# Patient Record
Sex: FEMALE | Race: WHITE | Employment: FULL TIME | URBAN - METROPOLITAN AREA
[De-identification: names, ages, dates, MRNs, and addresses within clinical notes are randomized per-mention and may not be internally consistent; named-entity substitution may affect disease eponyms.]

---

## 2018-05-23 PROBLEM — R10.2 PELVIC PAIN: Status: ACTIVE | Noted: 2018-05-23

## 2018-05-23 PROCEDURE — 88175 CYTOPATH C/V AUTO FLUID REDO: CPT | Performed by: OBSTETRICS & GYNECOLOGY

## 2018-05-23 PROCEDURE — 87624 HPV HI-RISK TYP POOLED RSLT: CPT | Performed by: OBSTETRICS & GYNECOLOGY

## 2018-08-22 PROCEDURE — 87480 CANDIDA DNA DIR PROBE: CPT | Performed by: OBSTETRICS & GYNECOLOGY

## 2018-08-22 PROCEDURE — 87510 GARDNER VAG DNA DIR PROBE: CPT | Performed by: OBSTETRICS & GYNECOLOGY

## 2018-08-22 PROCEDURE — 87660 TRICHOMONAS VAGIN DIR PROBE: CPT | Performed by: OBSTETRICS & GYNECOLOGY

## 2018-10-11 PROCEDURE — 87536 HIV-1 QUANT&REVRSE TRNSCRPJ: CPT | Performed by: OBSTETRICS & GYNECOLOGY

## 2018-10-11 PROCEDURE — 86762 RUBELLA ANTIBODY: CPT | Performed by: OBSTETRICS & GYNECOLOGY

## 2018-10-11 PROCEDURE — 86901 BLOOD TYPING SEROLOGIC RH(D): CPT | Performed by: OBSTETRICS & GYNECOLOGY

## 2018-10-11 PROCEDURE — 87389 HIV-1 AG W/HIV-1&-2 AB AG IA: CPT | Performed by: OBSTETRICS & GYNECOLOGY

## 2018-10-11 PROCEDURE — 86702 HIV-2 ANTIBODY: CPT | Performed by: OBSTETRICS & GYNECOLOGY

## 2018-10-11 PROCEDURE — 87086 URINE CULTURE/COLONY COUNT: CPT | Performed by: OBSTETRICS & GYNECOLOGY

## 2018-10-11 PROCEDURE — 86701 HIV-1ANTIBODY: CPT | Performed by: OBSTETRICS & GYNECOLOGY

## 2018-10-11 PROCEDURE — 86850 RBC ANTIBODY SCREEN: CPT | Performed by: OBSTETRICS & GYNECOLOGY

## 2018-10-11 PROCEDURE — 86780 TREPONEMA PALLIDUM: CPT | Performed by: OBSTETRICS & GYNECOLOGY

## 2018-10-11 PROCEDURE — 86900 BLOOD TYPING SEROLOGIC ABO: CPT | Performed by: OBSTETRICS & GYNECOLOGY

## 2018-11-06 PROBLEM — F17.200 SMOKER: Status: ACTIVE | Noted: 2018-11-06

## 2018-11-06 PROBLEM — Z86.14 HISTORY OF MRSA INFECTION: Status: ACTIVE | Noted: 2018-11-06

## 2018-11-06 PROBLEM — O99.210 OBESITY AFFECTING PREGNANCY, ANTEPARTUM: Status: ACTIVE | Noted: 2018-11-06

## 2018-12-03 PROCEDURE — 81508 FTL CGEN ABNOR TWO PROTEINS: CPT | Performed by: OBSTETRICS & GYNECOLOGY

## 2018-12-03 PROCEDURE — 36415 COLL VENOUS BLD VENIPUNCTURE: CPT | Performed by: OBSTETRICS & GYNECOLOGY

## 2019-01-23 PROCEDURE — 87081 CULTURE SCREEN ONLY: CPT | Performed by: OBSTETRICS & GYNECOLOGY

## 2019-03-11 PROCEDURE — 87389 HIV-1 AG W/HIV-1&-2 AB AG IA: CPT | Performed by: OBSTETRICS & GYNECOLOGY

## 2019-04-29 ENCOUNTER — HOSPITAL ENCOUNTER (OUTPATIENT)
Facility: HOSPITAL | Age: 34
Setting detail: OBSERVATION
Discharge: HOME | End: 2019-04-29
Attending: OBSTETRICS & GYNECOLOGY | Admitting: OBSTETRICS & GYNECOLOGY
Payer: COMMERCIAL

## 2019-04-29 VITALS
DIASTOLIC BLOOD PRESSURE: 77 MMHG | TEMPERATURE: 98 F | OXYGEN SATURATION: 98 % | SYSTOLIC BLOOD PRESSURE: 133 MMHG | HEART RATE: 84 BPM | RESPIRATION RATE: 18 BRPM

## 2019-04-29 PROBLEM — Z34.90 PREGNANCY: Status: ACTIVE | Noted: 2019-04-29

## 2019-04-29 PROCEDURE — 59025 FETAL NON-STRESS TEST: CPT

## 2019-04-29 PROCEDURE — 99202 OFFICE O/P NEW SF 15 MIN: CPT

## 2019-04-29 RX ORDER — MELATONIN
325
Status: ON HOLD | COMMUNITY
End: 2019-06-02

## 2019-04-29 NOTE — NST
Nonstress Test   Patient: Shazia Palomino    Gestation: 34w4d    NST:       Variability: Moderate           Accelerations: Yes           Decelerations: None            Baseline: 145 BPM           Uterine Irritability: No           Contractions: Not presen

## 2019-04-29 NOTE — PROGRESS NOTES
POC discussed c pt. Offered to transfer to ER for evaluation or pt may be discharged to home. Pt states she does not want to go to ER. Pt instructed to return to ER if symptoms increase or persist.   Pt expresses verbal understanding.

## 2019-04-29 NOTE — PROGRESS NOTES
Pt is a 29year old female admitted to TR5/TRG5-A. Patient presents with:  Shortness Of Breath  Fatigue     Pt is  34w4d intra-uterine pregnancy. History obtained, consents signed. Oriented to room, staff, and plan of care.     Pt states that tod

## 2019-05-10 PROBLEM — O99.820 GBS (GROUP B STREPTOCOCCUS CARRIER), +RV CULTURE, CURRENTLY PREGNANT: Status: ACTIVE | Noted: 2019-05-10

## 2019-05-12 ENCOUNTER — HOSPITAL ENCOUNTER (OUTPATIENT)
Facility: HOSPITAL | Age: 34
Setting detail: OBSERVATION
Discharge: HOME | End: 2019-05-12
Attending: OBSTETRICS & GYNECOLOGY | Admitting: OBSTETRICS & GYNECOLOGY
Payer: COMMERCIAL

## 2019-05-12 VITALS
HEIGHT: 64 IN | WEIGHT: 240 LBS | HEART RATE: 81 BPM | BODY MASS INDEX: 40.97 KG/M2 | RESPIRATION RATE: 16 BRPM | DIASTOLIC BLOOD PRESSURE: 73 MMHG | TEMPERATURE: 99 F | SYSTOLIC BLOOD PRESSURE: 129 MMHG

## 2019-05-12 PROCEDURE — 99213 OFFICE O/P EST LOW 20 MIN: CPT

## 2019-05-12 PROCEDURE — 59025 FETAL NON-STRESS TEST: CPT

## 2019-05-13 NOTE — PROGRESS NOTES
Written and verbal discharge instructions given. Questions answered. Pt verbalized understanding. Pt ambulated off unit with boyfriend and family present. Pt alert and stable.

## 2019-05-13 NOTE — PROGRESS NOTES
Pt ambulated for 2 hours with no cervical change  SVE ftp/50/-4  VTX  S/s labor discussed  Follow up in office 2 days if not in labor

## 2019-05-13 NOTE — PROGRESS NOTES
Pt back from ambulating. Pt states she feels more pressure from the ctxs. Pt to bed. efm and toco applied.

## 2019-05-13 NOTE — PROGRESS NOTES
admitted to triage 5 via wheelchair with family present. Pt to bathroom, changed into gown. Pt to bed. Pt states ctxs started at 1700, denies lof or bleeding, states it feels like pressure in her abdomen and back. efm and toco applied.

## 2019-05-13 NOTE — PROGRESS NOTES
poc discussed with pt including ambulation and rechecking the cervix after two hours. Pt agrees. efm and toco removed. Pt up to ambulate with boyfriend present. Pt alert and stable.

## 2019-05-23 ENCOUNTER — HOSPITAL ENCOUNTER (OUTPATIENT)
Facility: HOSPITAL | Age: 34
Setting detail: OBSERVATION
Discharge: HOME | End: 2019-05-23
Attending: OBSTETRICS & GYNECOLOGY | Admitting: OBSTETRICS & GYNECOLOGY
Payer: COMMERCIAL

## 2019-05-23 VITALS
DIASTOLIC BLOOD PRESSURE: 68 MMHG | HEART RATE: 77 BPM | SYSTOLIC BLOOD PRESSURE: 123 MMHG | WEIGHT: 245 LBS | BODY MASS INDEX: 42 KG/M2 | TEMPERATURE: 98 F

## 2019-05-23 PROCEDURE — 99213 OFFICE O/P EST LOW 20 MIN: CPT

## 2019-05-23 PROCEDURE — 59025 FETAL NON-STRESS TEST: CPT

## 2019-05-23 NOTE — NST
Nonstress Test   Patient: Eulalia Dale    Gestation: 38w0d    NST:       Variability: Moderate           Accelerations: Yes           Decelerations: None            Baseline: 135 BPM           Uterine Irritability: No           Contractions: Irregular

## 2019-05-23 NOTE — PROGRESS NOTES
Pt admitted to triage room 1 for c/o ctx's q 5-6 minutes that became stronger and more painful at 1300. Pt denies vaginal leaking and/or bleeding. Pt denies complications with the pregnancy and denies significant md hx.  Monitor tested and applied fht's 130

## 2019-05-23 NOTE — PROGRESS NOTES
Pt discharged home with labor and kick count instructions pt verbalizes understanding. Pt encouraged to increase fluid intake and rest. Pt directed to follow up at next scheduled OB visit and to call OB if symptoms worsen or with any questions.  Pt discharg

## 2019-05-29 ENCOUNTER — TELEPHONE (OUTPATIENT)
Dept: OBGYN UNIT | Facility: HOSPITAL | Age: 34
End: 2019-05-29

## 2019-05-30 ENCOUNTER — HOSPITAL ENCOUNTER (INPATIENT)
Facility: HOSPITAL | Age: 34
LOS: 3 days | Discharge: HOME | End: 2019-06-02
Attending: OBSTETRICS & GYNECOLOGY | Admitting: OBSTETRICS & GYNECOLOGY
Payer: COMMERCIAL

## 2019-05-30 ENCOUNTER — APPOINTMENT (OUTPATIENT)
Dept: OBGYN CLINIC | Facility: HOSPITAL | Age: 34
End: 2019-05-30
Payer: COMMERCIAL

## 2019-05-30 PROCEDURE — 86780 TREPONEMA PALLIDUM: CPT | Performed by: OBSTETRICS & GYNECOLOGY

## 2019-05-30 PROCEDURE — 86901 BLOOD TYPING SEROLOGIC RH(D): CPT | Performed by: OBSTETRICS & GYNECOLOGY

## 2019-05-30 PROCEDURE — 85027 COMPLETE CBC AUTOMATED: CPT | Performed by: OBSTETRICS & GYNECOLOGY

## 2019-05-30 PROCEDURE — 86900 BLOOD TYPING SEROLOGIC ABO: CPT | Performed by: OBSTETRICS & GYNECOLOGY

## 2019-05-30 PROCEDURE — 86850 RBC ANTIBODY SCREEN: CPT | Performed by: OBSTETRICS & GYNECOLOGY

## 2019-05-30 RX ORDER — TRISODIUM CITRATE DIHYDRATE AND CITRIC ACID MONOHYDRATE 500; 334 MG/5ML; MG/5ML
30 SOLUTION ORAL 4 TIMES DAILY PRN
Status: DISCONTINUED | OUTPATIENT
Start: 2019-05-30 | End: 2019-06-01 | Stop reason: HOSPADM

## 2019-05-30 RX ORDER — ZOLPIDEM TARTRATE 5 MG/1
5 TABLET ORAL NIGHTLY PRN
Status: DISCONTINUED | OUTPATIENT
Start: 2019-05-30 | End: 2019-06-01 | Stop reason: HOSPADM

## 2019-05-30 RX ORDER — DEXTROSE, SODIUM CHLORIDE, SODIUM LACTATE, POTASSIUM CHLORIDE, AND CALCIUM CHLORIDE 5; .6; .31; .03; .02 G/100ML; G/100ML; G/100ML; G/100ML; G/100ML
INJECTION, SOLUTION INTRAVENOUS AS NEEDED
Status: DISCONTINUED | OUTPATIENT
Start: 2019-05-30 | End: 2019-06-01 | Stop reason: HOSPADM

## 2019-05-30 RX ORDER — SODIUM CHLORIDE, SODIUM LACTATE, POTASSIUM CHLORIDE, CALCIUM CHLORIDE 600; 310; 30; 20 MG/100ML; MG/100ML; MG/100ML; MG/100ML
INJECTION, SOLUTION INTRAVENOUS CONTINUOUS
Status: DISCONTINUED | OUTPATIENT
Start: 2019-05-30 | End: 2019-06-01 | Stop reason: HOSPADM

## 2019-05-30 RX ORDER — TERBUTALINE SULFATE 1 MG/ML
0.25 INJECTION, SOLUTION SUBCUTANEOUS AS NEEDED
Status: DISCONTINUED | OUTPATIENT
Start: 2019-05-30 | End: 2019-06-01 | Stop reason: HOSPADM

## 2019-05-30 RX ORDER — IBUPROFEN 600 MG/1
600 TABLET ORAL ONCE AS NEEDED
Status: DISCONTINUED | OUTPATIENT
Start: 2019-05-30 | End: 2019-06-01 | Stop reason: HOSPADM

## 2019-05-31 PROCEDURE — 3E033VJ INTRODUCTION OF OTHER HORMONE INTO PERIPHERAL VEIN, PERCUTANEOUS APPROACH: ICD-10-PCS | Performed by: OBSTETRICS & GYNECOLOGY

## 2019-05-31 PROCEDURE — 80053 COMPREHEN METABOLIC PANEL: CPT | Performed by: OBSTETRICS & GYNECOLOGY

## 2019-05-31 PROCEDURE — 82570 ASSAY OF URINE CREATININE: CPT | Performed by: OBSTETRICS & GYNECOLOGY

## 2019-05-31 PROCEDURE — 3E0P7VZ INTRODUCTION OF HORMONE INTO FEMALE REPRODUCTIVE, VIA NATURAL OR ARTIFICIAL OPENING: ICD-10-PCS | Performed by: OBSTETRICS & GYNECOLOGY

## 2019-05-31 PROCEDURE — 84550 ASSAY OF BLOOD/URIC ACID: CPT | Performed by: OBSTETRICS & GYNECOLOGY

## 2019-05-31 PROCEDURE — 85025 COMPLETE CBC W/AUTO DIFF WBC: CPT | Performed by: OBSTETRICS & GYNECOLOGY

## 2019-05-31 PROCEDURE — 0KQM0ZZ REPAIR PERINEUM MUSCLE, OPEN APPROACH: ICD-10-PCS | Performed by: OBSTETRICS & GYNECOLOGY

## 2019-05-31 PROCEDURE — 84156 ASSAY OF PROTEIN URINE: CPT | Performed by: OBSTETRICS & GYNECOLOGY

## 2019-05-31 RX ORDER — ONDANSETRON 2 MG/ML
4 INJECTION INTRAMUSCULAR; INTRAVENOUS EVERY 4 HOURS PRN
Status: DISCONTINUED | OUTPATIENT
Start: 2019-05-31 | End: 2019-06-01

## 2019-05-31 RX ORDER — NALBUPHINE HCL 10 MG/ML
2.5 AMPUL (ML) INJECTION
Status: DISCONTINUED | OUTPATIENT
Start: 2019-05-31 | End: 2019-06-01

## 2019-05-31 RX ORDER — EPHEDRINE SULFATE/0.9% NACL/PF 25 MG/5 ML
5 SYRINGE (ML) INTRAVENOUS AS NEEDED
Status: DISCONTINUED | OUTPATIENT
Start: 2019-05-31 | End: 2019-06-01

## 2019-05-31 RX ORDER — ACETAMINOPHEN 325 MG/1
650 TABLET ORAL EVERY 6 HOURS PRN
Status: DISCONTINUED | OUTPATIENT
Start: 2019-05-31 | End: 2019-06-02

## 2019-05-31 NOTE — CM/SW NOTE
SW received an order for advance directives. SW spoke with RN, and requested spiritual care completes medical POA. RN to order .      Josef Gan

## 2019-05-31 NOTE — H&P
OB H&P    33yo [id number] at 39w1d admitted for elective IOL. Denies contractions, no lof, no vb, +FM. Prenatal course complicated by obesity and GBS positive.     OB History    Para Term  AB Living   4 2 2   1 2   SAB TAB Ectopic Multiple Live Wt 245 lb (111.1 kg)   LMP 08/30/2018 (Approximate)   Breastfeeding?  Yes   BMI 42.05 kg/m²   Gen: appears well, nad  Abd: gravid, no fundal tenderness  Ext: nontender, no edema    EFM: 135/mod/+acc/-dec  [place]: q4 min  SVE: 1/50/-3    GBS pos    A/P: 35yo

## 2019-05-31 NOTE — PLAN OF CARE
Problem: BIRTH - VAGINAL/ SECTION  Goal: Fetal and maternal status remain reassuring during the birth process  Description  INTERVENTIONS:  - Monitor vital signs  - Monitor fetal heart rate  - Monitor uterine activity  - Monitor labor progression Patient/Family Short Term Goal  Description  Patient's Short Term Goal: Adequate Pain Relief    Interventions:  - See additional Care Plan goals for specific interventions   Outcome: Progressing

## 2019-05-31 NOTE — PROGRESS NOTES
[facility]      Labor Progress Note    Colon Aver Patient Status:  Inpatient    2/3/1985 MRN [id number]   Location [address] [facility] Attending Kirstin Dixon MD   Hosp Day # 1 PCP INES NICOLE      SUBJECTIVE:    Int

## 2019-05-31 NOTE — PROGRESS NOTES
[facility]      Labor Progress Note    Lady Feng Patient Status:  Inpatient    2/3/1985 MRN [id number]   Location 1818 [facility] Attending Xiomara Joaquin MD   Hosp Day # 1 PCP INES NICOLE      SUBJECTIVE:    Int

## 2019-05-31 NOTE — PROGRESS NOTES
Report to SAULO Dorado RN at this time. POC discussed and enforced. Pt stable in bed with IV infusing and call light in reach. Pt verbalized understanding of POC.

## 2019-05-31 NOTE — PROGRESS NOTES
05/31/19 1701   Clinical Encounter Type   Visited With Patient and family together   Continue Visiting No   Referral From Patient  (Pt. requested an advance directive)   Referral To    Patient Spiritual Encounters   Spiritual Interventions Chapl

## 2019-05-31 NOTE — PROGRESS NOTES
Report from GIRISH Ching RN @ this time. POC discussed and will enforce. Pt stable in bed with IV infusing and call light in reach. Pt verbalized understanding of POC. Will continue to monitor.

## 2019-06-01 PROCEDURE — 85025 COMPLETE CBC W/AUTO DIFF WBC: CPT | Performed by: OBSTETRICS & GYNECOLOGY

## 2019-06-01 RX ORDER — HYDROCODONE BITARTRATE AND ACETAMINOPHEN 5; 325 MG/1; MG/1
1 TABLET ORAL EVERY 6 HOURS PRN
Status: DISCONTINUED | OUTPATIENT
Start: 2019-06-01 | End: 2019-06-02

## 2019-06-01 RX ORDER — IBUPROFEN 600 MG/1
600 TABLET ORAL EVERY 6 HOURS
Status: DISCONTINUED | OUTPATIENT
Start: 2019-06-01 | End: 2019-06-02

## 2019-06-01 RX ORDER — SIMETHICONE 80 MG
80 TABLET,CHEWABLE ORAL 3 TIMES DAILY PRN
Status: DISCONTINUED | OUTPATIENT
Start: 2019-06-01 | End: 2019-06-02

## 2019-06-01 RX ORDER — BISACODYL 10 MG
10 SUPPOSITORY, RECTAL RECTAL ONCE AS NEEDED
Status: DISCONTINUED | OUTPATIENT
Start: 2019-06-01 | End: 2019-06-02

## 2019-06-01 RX ORDER — DOCUSATE SODIUM 100 MG/1
100 CAPSULE, LIQUID FILLED ORAL
Status: DISCONTINUED | OUTPATIENT
Start: 2019-06-01 | End: 2019-06-02

## 2019-06-01 NOTE — PROGRESS NOTES
[facility]      Labor Progress Note    Jazzmine Innocent Patient Status:  Inpatient    2/3/1985 MRN [id number]   Location 1818 [facility] Attending Rebekah Porras MD   Hosp Day # 1 PCP INES NICOLE      SUBJECTIVE:    Int

## 2019-06-01 NOTE — PROGRESS NOTES
Patient admitted to room 2216. Report received from L&D RN Wendy Abdi. ID bands verified. Pt resting in bed with call light in reach, bed in low position. Education materials reviewed (and left at bedside) with Pt and boyfriend, Louisa Guerrero.

## 2019-06-01 NOTE — PLAN OF CARE
Problem: BIRTH - VAGINAL/ SECTION  Goal: Fetal and maternal status remain reassuring during the birth process  Description  INTERVENTIONS:  - Monitor vital signs  - Monitor fetal heart rate  - Monitor uterine activity  - Monitor labor progression physical needs  - Identify cognitive and physical deficits and behaviors that affect risk of falls.   - [place] fall precautions as indicated by assessment.  - Educate pt/family on patient safety including physical limitations  - Instruct pt to call for a

## 2019-06-01 NOTE — PLAN OF CARE
Problem: SAFETY ADULT - FALL  Goal: Free from fall injury  Description  INTERVENTIONS:  - Assess pt frequently for physical needs  - Identify cognitive and physical deficits and behaviors that affect risk of falls.   - Capac fall precautions as indica appropriate  6/1/2019 0001 by Manjinder Warren RN  Outcome: Completed  5/31/2019 1938 by Manjinder Warren, RN  Outcome: Progressing     Problem: ANXIETY  Goal: Will report anxiety at manageable levels  Description  INTERVENTIONS:  - Macario Reynolds

## 2019-06-01 NOTE — PROGRESS NOTES
Report from Elaina Boo  RN @ this time. POC discussed and will enforce. Pt stable in bed with IV infusing and call light in reach. Pt verbalized understanding of POC. Will continue to monitor.

## 2019-06-01 NOTE — L&D DELIVERY NOTE
Bouchra Stinson, Girl Clarissa Silva [[id number]]    Labor Events     labor?:  No   steroids?:  None  Cervical ripening date/time:  2019 183  Cervical ripening type:  Cervidil  Antibiotics received during labor?:  Yes  Antibiotics (enter # doses in co Apgars    Living status:  Living   Apgar Scoring Key:     0 1 2    Skin color Blue or pale Acrocyanotic Completely pink    Heart rate Absent <100 bpm >100 bpm    Reflex irritability No response Grimace Cry or active withdrawal    Muscle tone Limp Some fl

## 2019-06-01 NOTE — PLAN OF CARE
Problem: SAFETY ADULT - FALL  Goal: Free from fall injury  Description  INTERVENTIONS:  - Assess pt frequently for physical needs  - Identify cognitive and physical deficits and behaviors that affect risk of falls.   - [place] fall precautions as indica previous experience with breast feeding.  - Provide information as needed about early infant feeding cues (e.g., rooting, lip smacking, sucking fingers/hand) versus late cue of crying.  - Discuss/demonstrate breast feeding aids (e.g., infant sling, nursing engorgement. - Instruct on breast care. - Provide comfort measures.   Outcome: Completed     Problem: POSTPARTUM  Goal: Establishment of adequate milk supply with medication/procedure interruptions  Description  INTERVENTIONS:  - Review techniques for mil

## 2019-06-01 NOTE — PROGRESS NOTES
Patient up to bathroom with assist x 2. Unable to void at this time. Educated on increasing PO hydration and pt verbalized understanding. Patient transferred to mother/baby room 2216 per wheelchair in stable condition with baby and personal belongings.   A

## 2019-06-01 NOTE — PROGRESS NOTES
Postpartum Day 1    Pt without complaints. Pt reports sig cramping, moderate relief with tylenol and motrin. States she had norco with her previous pregnancies.     Temp:  [97.7 °F (36.5 °C)-98.9 °F (37.2 °C)] 97.9 °F (36.6 °C)  Pulse:  [67-88] 68  Resp:  [ 05/31/19 1100 122/55 — — 76 20 —   05/31/19 1050 119/65 — — 77 — —   05/31/19 1047 135/63 — — 82 — —   05/31/19 1044 134/62 — — 79 — —   05/31/19 1040 136/63 — — 82 — —   05/31/19 1037 143/63 — — 78 — —   05/31/19 1035 — — — 68 — —   05/31/19 1034 — — —

## 2019-06-02 VITALS
HEIGHT: 64 IN | SYSTOLIC BLOOD PRESSURE: 114 MMHG | OXYGEN SATURATION: 98 % | RESPIRATION RATE: 18 BRPM | HEART RATE: 68 BPM | DIASTOLIC BLOOD PRESSURE: 62 MMHG | WEIGHT: 245 LBS | BODY MASS INDEX: 41.83 KG/M2 | TEMPERATURE: 98 F

## 2019-06-02 NOTE — PROGRESS NOTES
Postpartum Day 2    Pt without complaints.     Temp:  [97.7 °F (36.5 °C)-98.1 °F (36.7 °C)] 97.7 °F (36.5 °C)  Pulse:  [68] 68  Resp:  [18] 18  BP: (109-114)/(62) 114/62  abd  soft, NT, ND, fundus firm below umbilicus  perineum NL lochia  extr  trace edema,

## 2019-06-04 ENCOUNTER — APPOINTMENT (OUTPATIENT)
Dept: ULTRASOUND IMAGING | Facility: HOSPITAL | Age: 34
End: 2019-06-04
Attending: EMERGENCY MEDICINE
Payer: COMMERCIAL

## 2019-06-04 ENCOUNTER — APPOINTMENT (OUTPATIENT)
Dept: GENERAL RADIOLOGY | Facility: HOSPITAL | Age: 34
End: 2019-06-04
Attending: EMERGENCY MEDICINE
Payer: COMMERCIAL

## 2019-06-04 ENCOUNTER — HOSPITAL ENCOUNTER (EMERGENCY)
Facility: HOSPITAL | Age: 34
Discharge: HOME | End: 2019-06-04
Attending: EMERGENCY MEDICINE
Payer: COMMERCIAL

## 2019-06-04 ENCOUNTER — HOSPITAL ENCOUNTER (OUTPATIENT)
Facility: HOSPITAL | Age: 34
Setting detail: OBSERVATION
Discharge: HOME | End: 2019-06-04
Attending: OBSTETRICS & GYNECOLOGY | Admitting: OBSTETRICS & GYNECOLOGY
Payer: COMMERCIAL

## 2019-06-04 ENCOUNTER — APPOINTMENT (OUTPATIENT)
Dept: CT IMAGING | Facility: HOSPITAL | Age: 34
End: 2019-06-04
Attending: EMERGENCY MEDICINE
Payer: COMMERCIAL

## 2019-06-04 VITALS
OXYGEN SATURATION: 98 % | HEART RATE: 58 BPM | WEIGHT: 240 LBS | HEIGHT: 64 IN | BODY MASS INDEX: 40.97 KG/M2 | TEMPERATURE: 98 F | RESPIRATION RATE: 21 BRPM | SYSTOLIC BLOOD PRESSURE: 148 MMHG | DIASTOLIC BLOOD PRESSURE: 78 MMHG

## 2019-06-04 DIAGNOSIS — R06.00 DYSPNEA, UNSPECIFIED TYPE: Primary | ICD-10-CM

## 2019-06-04 PROCEDURE — 83735 ASSAY OF MAGNESIUM: CPT | Performed by: EMERGENCY MEDICINE

## 2019-06-04 PROCEDURE — 85025 COMPLETE CBC W/AUTO DIFF WBC: CPT | Performed by: OBSTETRICS & GYNECOLOGY

## 2019-06-04 PROCEDURE — 93005 ELECTROCARDIOGRAM TRACING: CPT

## 2019-06-04 PROCEDURE — 36415 COLL VENOUS BLD VENIPUNCTURE: CPT

## 2019-06-04 PROCEDURE — 85027 COMPLETE CBC AUTOMATED: CPT | Performed by: OBSTETRICS & GYNECOLOGY

## 2019-06-04 PROCEDURE — 99285 EMERGENCY DEPT VISIT HI MDM: CPT

## 2019-06-04 PROCEDURE — 84484 ASSAY OF TROPONIN QUANT: CPT | Performed by: OBSTETRICS & GYNECOLOGY

## 2019-06-04 PROCEDURE — 85378 FIBRIN DEGRADE SEMIQUANT: CPT | Performed by: EMERGENCY MEDICINE

## 2019-06-04 PROCEDURE — 83880 ASSAY OF NATRIURETIC PEPTIDE: CPT | Performed by: EMERGENCY MEDICINE

## 2019-06-04 PROCEDURE — 93010 ELECTROCARDIOGRAM REPORT: CPT

## 2019-06-04 PROCEDURE — 85610 PROTHROMBIN TIME: CPT | Performed by: EMERGENCY MEDICINE

## 2019-06-04 PROCEDURE — 93970 EXTREMITY STUDY: CPT | Performed by: EMERGENCY MEDICINE

## 2019-06-04 PROCEDURE — 80053 COMPREHEN METABOLIC PANEL: CPT | Performed by: OBSTETRICS & GYNECOLOGY

## 2019-06-04 PROCEDURE — 81002 URINALYSIS NONAUTO W/O SCOPE: CPT

## 2019-06-04 PROCEDURE — 85007 BL SMEAR W/DIFF WBC COUNT: CPT | Performed by: OBSTETRICS & GYNECOLOGY

## 2019-06-04 PROCEDURE — 71275 CT ANGIOGRAPHY CHEST: CPT | Performed by: EMERGENCY MEDICINE

## 2019-06-04 PROCEDURE — 84484 ASSAY OF TROPONIN QUANT: CPT | Performed by: EMERGENCY MEDICINE

## 2019-06-04 PROCEDURE — 71046 X-RAY EXAM CHEST 2 VIEWS: CPT | Performed by: EMERGENCY MEDICINE

## 2019-06-04 RX ORDER — ACETAMINOPHEN 500 MG
500 TABLET ORAL EVERY 6 HOURS PRN
COMMUNITY
End: 2019-06-25

## 2019-06-04 RX ORDER — IBUPROFEN 200 MG
200 TABLET ORAL EVERY 6 HOURS PRN
COMMUNITY
End: 2019-12-02

## 2019-06-04 RX ORDER — ACETAMINOPHEN 500 MG
1000 TABLET ORAL ONCE
Status: COMPLETED | OUTPATIENT
Start: 2019-06-04 | End: 2019-06-04

## 2019-06-04 NOTE — ED INITIAL ASSESSMENT (HPI)
Patient to ED from [facility], here for chest pain, SOB, headache, dizziness, and left arm numbness. + bilateral lower leg swelling.

## 2019-06-04 NOTE — ED PROVIDER NOTES
Chief Complaint   Patient presents with    Hypertension     Cardiac clearance 4-10-17 (lymph node Becker's_ 4-19-17 (tonsillectomy-[facility]). Denies chest pain/shortness of breath/dizziness. Complaiints of slight swelling in ankles. Dr. Lenore Luna III ENT  Performing both surgeries. Currently smoking 0.5 pack/day. Patient is a nurse at [employer]. Patient Seen in: [facility] Emergency Department    History   Patient presents with:  Chest Pain Angina (cardiovascular)  Dyspnea VA SOB (respiratory)    Stated Complaint: chest pain, SOB    HPI    This is a 43-year-old female complaint of chest ti Oral   SpO2 06/04/19 1500 99 %   O2 Device 06/04/19 1601 None (Room air)       Current:/78   Pulse 59   Temp 98 °F (36.7 °C) (Temporal)   Resp 17   Ht 162.6 cm (5' 4\")   Wt 108.9 kg   LMP 08/30/2018 (Approximate)   SpO2 98%   Breastfeeding?  No   BMI pregnant females exclusively to validate the 95% negative predictive value  for venous thromboembolism when the D-Dimer is greater than 0.50 ug/mL (FEU). Proceed with caution from clinical presentation.      POCT URINALYSIS DIPSTICK - Abnormal; Notable pressure ranging mainly in the 140s 2 of these were above 150.   Cardiac work-up was negative no evidence of coronary syndrome no evidence of pulmonary embolus the case was discussed with Yair Hanley who did not feel that these blood pressure elevation

## 2019-06-04 NOTE — PROGRESS NOTES
Dr. MAC notified re. Pt's admission, history, symptoms and vital signs. POC reviewed. Orders received.

## 2019-06-04 NOTE — PROGRESS NOTES
Pt is 35yo postpartum w/ on   admitted to 104 from MD office for evaluation of BP and pain. Pt states that she smoked @ 1330 and experienced a headache and chest pain 8/10. She also has some SOB  With the chest pain.   Pt states that she also had

## 2019-06-04 NOTE — PROGRESS NOTES
Pt c/o increased chest pain 5/10 and increased headache 4/10. Pt denies SOB, nausea or left arm numbness. Dr. Dustin Greer updated re. pts vs and pain. Orders received to transfer pt to ER for cardia workup.

## 2019-06-05 ENCOUNTER — TELEPHONE (OUTPATIENT)
Dept: OBGYN UNIT | Facility: HOSPITAL | Age: 34
End: 2019-06-05

## 2019-06-05 NOTE — ED NOTES
No change in patient's assessment. Continues to have a slight headache and mildly not feeling right to the left side of her chest, no real pain  Aware we are awaiting cardiology to call back.

## 2019-06-07 ENCOUNTER — HOSPITAL ENCOUNTER (INPATIENT)
Facility: HOSPITAL | Age: 34
LOS: 1 days | Discharge: HOME | DRG: 776 | End: 2019-06-08
Attending: OBSTETRICS & GYNECOLOGY | Admitting: OBSTETRICS & GYNECOLOGY
Payer: COMMERCIAL

## 2019-06-07 ENCOUNTER — HOSPITAL ENCOUNTER (OUTPATIENT)
Dept: CV DIAGNOSTICS | Facility: HOSPITAL | Age: 34
Discharge: HOME | End: 2019-06-07
Attending: INTERNAL MEDICINE
Payer: COMMERCIAL

## 2019-06-07 ENCOUNTER — APPOINTMENT (OUTPATIENT)
Dept: GENERAL RADIOLOGY | Facility: HOSPITAL | Age: 34
DRG: 776 | End: 2019-06-07
Attending: INTERNAL MEDICINE
Payer: COMMERCIAL

## 2019-06-07 ENCOUNTER — HOSPITAL ENCOUNTER (OUTPATIENT)
Facility: HOSPITAL | Age: 34
Setting detail: OBSERVATION
Discharge: HOME | End: 2019-06-07
Attending: OBSTETRICS & GYNECOLOGY | Admitting: OBSTETRICS & GYNECOLOGY
Payer: COMMERCIAL

## 2019-06-07 DIAGNOSIS — R42 DIZZINESS: ICD-10-CM

## 2019-06-07 DIAGNOSIS — R06.02 SOB (SHORTNESS OF BREATH): ICD-10-CM

## 2019-06-07 DIAGNOSIS — O13.9 PREGNANCY INDUCED HYPERTENSION, ANTEPARTUM: Primary | ICD-10-CM

## 2019-06-07 DIAGNOSIS — R60.0 BILATERAL LOWER EXTREMITY EDEMA: ICD-10-CM

## 2019-06-07 PROBLEM — I10 HYPERTENSION: Status: ACTIVE | Noted: 2019-06-07

## 2019-06-07 PROCEDURE — 71045 X-RAY EXAM CHEST 1 VIEW: CPT | Performed by: INTERNAL MEDICINE

## 2019-06-07 PROCEDURE — 85025 COMPLETE CBC W/AUTO DIFF WBC: CPT | Performed by: OBSTETRICS & GYNECOLOGY

## 2019-06-07 PROCEDURE — 84450 TRANSFERASE (AST) (SGOT): CPT | Performed by: OBSTETRICS & GYNECOLOGY

## 2019-06-07 PROCEDURE — 99214 OFFICE O/P EST MOD 30 MIN: CPT

## 2019-06-07 PROCEDURE — 93005 ELECTROCARDIOGRAM TRACING: CPT

## 2019-06-07 PROCEDURE — 84460 ALANINE AMINO (ALT) (SGPT): CPT | Performed by: OBSTETRICS & GYNECOLOGY

## 2019-06-07 PROCEDURE — 84550 ASSAY OF BLOOD/URIC ACID: CPT | Performed by: OBSTETRICS & GYNECOLOGY

## 2019-06-07 PROCEDURE — 82570 ASSAY OF URINE CREATININE: CPT | Performed by: OBSTETRICS & GYNECOLOGY

## 2019-06-07 PROCEDURE — 93306 TTE W/DOPPLER COMPLETE: CPT | Performed by: INTERNAL MEDICINE

## 2019-06-07 PROCEDURE — 84484 ASSAY OF TROPONIN QUANT: CPT | Performed by: INTERNAL MEDICINE

## 2019-06-07 PROCEDURE — 93010 ELECTROCARDIOGRAM REPORT: CPT | Performed by: INTERNAL MEDICINE

## 2019-06-07 PROCEDURE — 84156 ASSAY OF PROTEIN URINE: CPT | Performed by: OBSTETRICS & GYNECOLOGY

## 2019-06-07 PROCEDURE — 80053 COMPREHEN METABOLIC PANEL: CPT | Performed by: OBSTETRICS & GYNECOLOGY

## 2019-06-07 RX ORDER — ACETAMINOPHEN 500 MG
1000 TABLET ORAL EVERY 6 HOURS PRN
Status: DISCONTINUED | OUTPATIENT
Start: 2019-06-07 | End: 2019-06-08

## 2019-06-07 RX ORDER — IBUPROFEN 600 MG/1
600 TABLET ORAL EVERY 6 HOURS PRN
Status: DISCONTINUED | OUTPATIENT
Start: 2019-06-07 | End: 2019-06-08

## 2019-06-07 RX ORDER — SODIUM CHLORIDE, SODIUM LACTATE, POTASSIUM CHLORIDE, CALCIUM CHLORIDE 600; 310; 30; 20 MG/100ML; MG/100ML; MG/100ML; MG/100ML
INJECTION, SOLUTION INTRAVENOUS CONTINUOUS
Status: DISCONTINUED | OUTPATIENT
Start: 2019-06-07 | End: 2019-06-08

## 2019-06-07 RX ORDER — HYDROCHLOROTHIAZIDE 12.5 MG/1
25 CAPSULE, GELATIN COATED ORAL DAILY
Status: DISCONTINUED | OUTPATIENT
Start: 2019-06-07 | End: 2019-06-07

## 2019-06-07 RX ORDER — HYDROCODONE BITARTRATE AND ACETAMINOPHEN 5; 325 MG/1; MG/1
1 TABLET ORAL EVERY 4 HOURS PRN
Status: DISCONTINUED | OUTPATIENT
Start: 2019-06-07 | End: 2019-06-08

## 2019-06-07 RX ORDER — FUROSEMIDE 10 MG/ML
20 INJECTION INTRAMUSCULAR; INTRAVENOUS ONCE
Status: COMPLETED | OUTPATIENT
Start: 2019-06-07 | End: 2019-06-07

## 2019-06-07 RX ORDER — FUROSEMIDE 10 MG/ML
INJECTION INTRAMUSCULAR; INTRAVENOUS
Status: COMPLETED
Start: 2019-06-07 | End: 2019-06-07

## 2019-06-07 NOTE — CONSULTS
[facility]  Report of Consultation    Ching Aydins Patient Status:  Inpatient    2/3/1985 MRN [id number]   Location [address] [facility] Attending Ruth Morales MD   Hosp Day # 0 PCP Michaelle Meza MD     Reason for Consult lactated ringers infusion, , Intravenous, Continuous  •  magnesium sulfate 40mg/ml infusion, 2 g/hr, Intravenous, Continuous  •  acetaminophen (TYLENOL EXTRA STRENGTH) tab 1,000 mg, 1,000 mg, Oral, Q6H PRN  •  ibuprofen (MOTRIN) tab 600 mg, 600 mg, Oral, Q

## 2019-06-07 NOTE — PROGRESS NOTES
PT. SEVEN DAYS POSTPARTUM. HERE WITH COMPLAINTS OF HA FOR THE PAST 7 DAYS. PT. ALSO WITH PITTING EDEMA IN LOWER EXTREMITIES.

## 2019-06-07 NOTE — H&P
ADMISSION HISTORY AND PHYSICAL:    HPI: The patient is a 29year old [id number] postpartum day 7 sent from office for elevated /90s and persistent HA. Pt seen 3 days ago in ER for HTN and chest pain. CTA neg for PE. Echo ordered per cardiology.     Barbara Bartlett 06/01/19  0734 06/04/19  1510 06/07/19  1156   RBC 3.02* 2.92* 3.17*   HGB 9.6* 9.3* 10.1*   HCT 27.7* 26.6* 29.0*   MCV 91.7 91.1 91.5   MCH 31.8 31.8 31.9   MCHC 34.7 35.0 34.8   RDW 13.6 13.2 13.2   NEPRELIM 12.87* 4.62 5.37   WBC 17.5* 8.5 8.7   PLT 18

## 2019-06-08 VITALS
DIASTOLIC BLOOD PRESSURE: 68 MMHG | WEIGHT: 245 LBS | OXYGEN SATURATION: 98 % | RESPIRATION RATE: 20 BRPM | BODY MASS INDEX: 41.83 KG/M2 | HEIGHT: 64 IN | TEMPERATURE: 98 F | SYSTOLIC BLOOD PRESSURE: 141 MMHG | HEART RATE: 70 BPM

## 2019-06-08 RX ORDER — NIFEDIPINE 30 MG/1
30 TABLET, EXTENDED RELEASE ORAL DAILY
Status: CANCELLED | OUTPATIENT
Start: 2019-06-08

## 2019-06-08 RX ORDER — HYDROCHLOROTHIAZIDE 12.5 MG/1
25 CAPSULE, GELATIN COATED ORAL DAILY
Status: DISCONTINUED | OUTPATIENT
Start: 2019-06-08 | End: 2019-06-08

## 2019-06-08 RX ORDER — HYDROCODONE BITARTRATE AND ACETAMINOPHEN 10; 325 MG/1; MG/1
1 TABLET ORAL EVERY 4 HOURS PRN
Status: DISCONTINUED | OUTPATIENT
Start: 2019-06-08 | End: 2019-06-08

## 2019-06-08 RX ORDER — HYDROCHLOROTHIAZIDE 25 MG/1
25 TABLET ORAL DAILY
Qty: 30 TABLET | Refills: 11 | Status: SHIPPED | OUTPATIENT
Start: 2019-06-08 | End: 2019-08-26

## 2019-06-08 NOTE — PROGRESS NOTES
Pt d/c per w/c, written d/c instructions verbally explained to pt and . Copy given to pt. Pt verbalizes understanding of all follow up, complication and discharge instructions. Denies questions or concerns.

## 2019-06-08 NOTE — PROGRESS NOTES
[facility]  Cardiology Progress Note    Dharmesh Narrow Patient Status:  Inpatient    2/3/1985 MRN [id number]   Location [address] [facility] Attending Erasmo Mejia MD   Hosp Day # 1 PCP Mary Alice Fields MD     Assessment:  BLE Gross motor and sensory modalities preserved  Psychiatric: Normal mood and affect  Skin: Warm and dry, no obvious rashes     MEDICATIONS:    • lactated ringers 75 mL/hr at 06/08/19 0147   • magnesium sulfate 2 g/hr (06/08/19 0700)         Recent Labs     0

## 2019-06-08 NOTE — PROGRESS NOTES
S: pt without complaints.   Lochia light  O: VS-Temp:  [98 °F (36.7 °C)-99.7 °F (37.6 °C)] 99.7 °F (37.6 °C)  Pulse:  [54-75] 70  Resp:  [12-18] 18  BP: (108-163)/(55-86) 121/66       Abdomen- soft ND NT, uterus firm and contracted       Extremities- 2+ nicolas

## 2019-06-11 NOTE — PAYOR COMM NOTE
--------------  ADMISSION REVIEW     Payor: [address] [place] [address] Drive #:  [id number]  Authorization Number: [id number]    Admit date: 6/7/19  Admit time: 46       Admitting Physician: Ruth Morales MD  Attending Physician:  Niurka attKelly bennett No current outpatient medications on file.    ALLERGIES:     Gluten Flour            NAUSEA AND VOMITING  Hydrocodone-Acetami*    RASH  CIGARETE USE:  Social History    Tobacco Use      Smoking status: Current Every Day Smoker        Packs/day: 0.50 Location 1818 [facility] Attending Elio Severino MD   Hosp Day # 0 PCP Ned Lucas MD      Reason for Consultation:  Chest pain, HTN, edema, evaulate for peripartum cardiomyopathy (PPCM)        Assessment:  BLE edema-with prot •  acetaminophen (TYLENOL EXTRA STRENGTH) tab 1,000 mg, 1,000 mg, Oral, Q6H PRN  •  ibuprofen (MOTRIN) tab 600 mg, 600 mg, Oral, Q6H PRN     Review of Systems:  All systems were reviewed and are negative except as described above in HPI.     Physical Exam: Addendum   Date of Service:  2019  7:34 AM                      [facility]  Cardiology Progress Note           Andiesofy Anmol Patient Status:  Inpatient    2/3/1985 MRN [id number]   Location [address] [place] [address] Attending Supa [place] Neurologic: Alert and oriented,  Gross motor and sensory modalities preserved  Psychiatric: Normal mood and affect  Skin: Warm and dry, no obvious rashes     MEDICATIONS:  • lactated ringers 75 mL/hr at 06/08/19 0147   • magnesium sulfate 2 g/hr (06/08/19 A/P:       1. PPD #7 s/p        2.  PP pre-eclampsia- currently on mag. BP's improving. Cardiology consult done. HCTZ started. Echo normal       3. Continue mag until 24 hours.  Will stop tonight.                          Electronically signed by Gardenia Galvan

## 2019-06-25 ENCOUNTER — LAB ENCOUNTER (OUTPATIENT)
Dept: LAB | Age: 34
End: 2019-06-25
Attending: NURSE PRACTITIONER
Payer: COMMERCIAL

## 2019-06-25 DIAGNOSIS — O13.9 PREGNANCY INDUCED HYPERTENSION, ANTEPARTUM: ICD-10-CM

## 2019-06-25 PROCEDURE — 36415 COLL VENOUS BLD VENIPUNCTURE: CPT

## 2019-06-25 PROCEDURE — 80048 BASIC METABOLIC PNL TOTAL CA: CPT

## 2019-08-06 PROBLEM — O99.210 OBESITY AFFECTING PREGNANCY, ANTEPARTUM: Status: RESOLVED | Noted: 2018-11-06 | Resolved: 2019-08-06

## 2019-08-06 PROBLEM — O13.9 PIH (PREGNANCY INDUCED HYPERTENSION): Status: RESOLVED | Noted: 2019-06-07 | Resolved: 2019-08-06

## 2019-08-06 PROBLEM — R20.2 NUMBNESS AND TINGLING: Status: ACTIVE | Noted: 2019-08-06

## 2019-08-06 PROBLEM — R20.0 NUMBNESS AND TINGLING: Status: ACTIVE | Noted: 2019-08-06

## 2019-08-06 PROBLEM — Z34.90 PREGNANCY: Status: RESOLVED | Noted: 2019-04-29 | Resolved: 2019-08-06

## 2019-08-06 PROBLEM — R10.2 PELVIC PAIN: Status: RESOLVED | Noted: 2018-05-23 | Resolved: 2019-08-06

## 2019-08-06 PROBLEM — O99.820 GBS (GROUP B STREPTOCOCCUS CARRIER), +RV CULTURE, CURRENTLY PREGNANT: Status: RESOLVED | Noted: 2019-05-10 | Resolved: 2019-08-06

## 2019-08-06 PROCEDURE — 82784 ASSAY IGA/IGD/IGG/IGM EACH: CPT | Performed by: INTERNAL MEDICINE

## 2019-08-06 PROCEDURE — 86256 FLUORESCENT ANTIBODY TITER: CPT | Performed by: INTERNAL MEDICINE

## 2019-08-26 PROBLEM — M79.89 LEG SWELLING: Status: ACTIVE | Noted: 2019-08-26

## 2019-10-21 PROBLEM — M79.89 LEG SWELLING: Status: RESOLVED | Noted: 2019-08-26 | Resolved: 2019-10-21

## 2020-05-18 PROBLEM — O09.529 AMA (ADVANCED MATERNAL AGE) MULTIGRAVIDA 35+: Status: ACTIVE | Noted: 2020-05-18

## 2020-05-18 PROBLEM — O99.210 OBESITY AFFECTING PREGNANCY, ANTEPARTUM: Status: ACTIVE | Noted: 2020-05-18

## 2020-05-18 PROBLEM — O09.899 PRIOR PREGNANCY COMPLICATED BY PIH, ANTEPARTUM: Status: ACTIVE | Noted: 2020-05-18

## 2020-05-19 PROBLEM — O98.719 HIV AFFECTING PREGNANCY, ANTEPARTUM: Status: ACTIVE | Noted: 2020-05-19

## 2020-07-16 PROBLEM — Z78.9 FALSE POSITIVE SEROLOGY FOR HIV: Status: ACTIVE | Noted: 2020-05-19

## 2020-07-17 ENCOUNTER — OFFICE VISIT (OUTPATIENT)
Dept: PERINATAL CARE | Facility: HOSPITAL | Age: 35
End: 2020-07-17
Attending: OBSTETRICS & GYNECOLOGY
Payer: COMMERCIAL

## 2020-07-17 VITALS
HEART RATE: 79 BPM | SYSTOLIC BLOOD PRESSURE: 102 MMHG | BODY MASS INDEX: 37.22 KG/M2 | HEIGHT: 64 IN | WEIGHT: 218 LBS | DIASTOLIC BLOOD PRESSURE: 68 MMHG

## 2020-07-17 DIAGNOSIS — O09.529 ANTEPARTUM MULTIGRAVIDA OF ADVANCED MATERNAL AGE: ICD-10-CM

## 2020-07-17 DIAGNOSIS — Z78.9 FALSE POSITIVE SEROLOGY FOR HIV: ICD-10-CM

## 2020-07-17 DIAGNOSIS — O99.210 OBESITY AFFECTING PREGNANCY, ANTEPARTUM: ICD-10-CM

## 2020-07-17 DIAGNOSIS — O09.899 PRIOR PREGNANCY COMPLICATED BY PIH, ANTEPARTUM: ICD-10-CM

## 2020-07-17 PROCEDURE — 99203 OFFICE O/P NEW LOW 30 MIN: CPT | Performed by: OBSTETRICS & GYNECOLOGY

## 2020-07-17 PROCEDURE — 76811 OB US DETAILED SNGL FETUS: CPT | Performed by: OBSTETRICS & GYNECOLOGY

## 2020-07-17 NOTE — PROGRESS NOTES
Outpatient Maternal-Fetal Medicine Consultation    Dear Dr. Nicholas Duane    Thank you for requesting ultrasound evaluation and maternal fetal medicine consultation on your patient Shazia Palomino.   As you are aware she is a 28year old female [id number] with a has a past surgical history that includes  ();  (); tonsillectomy;  (2019); and incision and drainage (). Family History  The patient She indicated that the status of her mother is unknown.  She indicated that the status of 25w2d)  OFD 77.8 mm 87th% 24w1d  TCD 27.1 mm 93rd% 25w0d  HUM 39.6 mm 70th% 23w6d  VENTRp 5.5 mm n/a  CM 9.4 mm >95th%  NUCHAL FOLD 5.01 mm  HC/AC Ratio 1.144  49th%  FL/AC Ratio 0.216  n/a  BPD/FL Ratio 1.431  43rd%  HC/FL Ratio 5.303  68th%  EFW (lbs/oz) understood, but the disorder is clearly initiated by the presence of the trophoblast, and impaired placental angiogenesis plays an important role.    The clinical manifestations of preeclampsia can appear anytime from the second trimester to the first few w and 18 percent gestational hypertension.     Prediction  The ability to predict preeclampsia is currently of limited benefit because neither the development of the disorder nor its progression from mild to severe disease can be prevented in most patients, a preeclampsia, recurrent preeclampsia, gestational hypertension, or preeclampsia with onset as a multipara appear to be at highest risk of cardiovascular disease later in life, including during the premenopausal period.    In contrast, preeclampsia/eclampsia appears to result in a modest reduction in risk of preeclampsia when given to women at moderate to high risk of preeclampsia.   This approach has been studied in over 35,000 women, for both prevention of preeclampsia and prevention of progression of the dis (eg, history of low birth weight or small for gestational age, previous adverse pregnancy outcome, >10 year pregnancy interval)    If used, daily low-dose aspirin should be initiated in the 12th or 13th week of gestation, although adverse effects from gagandeep during delivery; however, no adverse maternal or fetal effects related to low-dose aspirin have been proven.   Major questions remain as to which, if any, subgroups of women are more likely to benefit from low-dose aspirin therapy, when treatment should be initiated between 12 and 28 weeks of gestation.    · In July 2016, the [facility] [employer] of Obstetricians and Gynecologists (ACOG) endorsed the USPSTF recommendation for use of low-dose aspirin (81 mg/day) in women at high risk of developing preeclampsia, an diabetes  · Intrauterine fetal death    As a result, enhanced pregnancy surveillance is advised for these patients including a comprehensive ultrasound to assess for fetal malformations  (at 20 weeks) and a third trimester ultrasound assessment for fetal g rates.    The patient has declined screening and diagnostic testing for fetal aneuploidy. She feels that she will not alter the management of her pregnancy even in the presence of a known  fetal aneuploidy.     OBESITY  Obesity during pregnancy is associat weight and BMI are independent risk factors for preeclampsia.              Studies have found that the increased risk of  birth in obese gravidas is primarily associated with obesity-related medical and  complications, rather than an intrins M.D.    The majority of the time (>50%) was spent in review of records, consultation and coordination of care. Our discussion is summarized above. The approximate physician face-to-face time was 40 minutes.

## 2020-08-31 PROBLEM — Z30.2 REQUEST FOR STERILIZATION: Status: ACTIVE | Noted: 2020-08-31

## 2020-10-20 DIAGNOSIS — Z34.90 PREGNANCY: Primary | ICD-10-CM

## 2020-11-02 ENCOUNTER — APPOINTMENT (OUTPATIENT)
Dept: LAB | Age: 35
End: 2020-11-02
Attending: OBSTETRICS & GYNECOLOGY
Payer: COMMERCIAL

## 2020-11-02 DIAGNOSIS — Z34.90 PREGNANCY: ICD-10-CM

## 2020-11-04 ENCOUNTER — TELEPHONE (OUTPATIENT)
Dept: OBGYN UNIT | Facility: HOSPITAL | Age: 35
End: 2020-11-04

## 2020-11-06 ENCOUNTER — APPOINTMENT (OUTPATIENT)
Dept: OBGYN CLINIC | Facility: HOSPITAL | Age: 35
End: 2020-11-06
Payer: COMMERCIAL

## 2020-11-06 ENCOUNTER — ANESTHESIA (OUTPATIENT)
Dept: OBGYN UNIT | Facility: HOSPITAL | Age: 35
End: 2020-11-06
Payer: COMMERCIAL

## 2020-11-06 ENCOUNTER — ANESTHESIA EVENT (OUTPATIENT)
Dept: OBGYN UNIT | Facility: HOSPITAL | Age: 35
End: 2020-11-06
Payer: COMMERCIAL

## 2020-11-06 ENCOUNTER — HOSPITAL ENCOUNTER (INPATIENT)
Facility: HOSPITAL | Age: 35
LOS: 2 days | Discharge: HOME | End: 2020-11-08
Attending: OBSTETRICS & GYNECOLOGY | Admitting: OBSTETRICS & GYNECOLOGY
Payer: COMMERCIAL

## 2020-11-06 PROBLEM — Z34.90 PREGNANCY: Status: ACTIVE | Noted: 2020-11-06

## 2020-11-06 PROCEDURE — 85007 BL SMEAR W/DIFF WBC COUNT: CPT | Performed by: OBSTETRICS & GYNECOLOGY

## 2020-11-06 PROCEDURE — 85027 COMPLETE CBC AUTOMATED: CPT | Performed by: OBSTETRICS & GYNECOLOGY

## 2020-11-06 PROCEDURE — 85025 COMPLETE CBC W/AUTO DIFF WBC: CPT | Performed by: OBSTETRICS & GYNECOLOGY

## 2020-11-06 PROCEDURE — 86850 RBC ANTIBODY SCREEN: CPT | Performed by: OBSTETRICS & GYNECOLOGY

## 2020-11-06 PROCEDURE — 86901 BLOOD TYPING SEROLOGIC RH(D): CPT | Performed by: OBSTETRICS & GYNECOLOGY

## 2020-11-06 PROCEDURE — 3E033VJ INTRODUCTION OF OTHER HORMONE INTO PERIPHERAL VEIN, PERCUTANEOUS APPROACH: ICD-10-PCS | Performed by: OBSTETRICS & GYNECOLOGY

## 2020-11-06 PROCEDURE — 86780 TREPONEMA PALLIDUM: CPT | Performed by: OBSTETRICS & GYNECOLOGY

## 2020-11-06 PROCEDURE — 86900 BLOOD TYPING SEROLOGIC ABO: CPT | Performed by: OBSTETRICS & GYNECOLOGY

## 2020-11-06 RX ORDER — TERBUTALINE SULFATE 1 MG/ML
0.25 INJECTION, SOLUTION SUBCUTANEOUS AS NEEDED
Status: DISCONTINUED | OUTPATIENT
Start: 2020-11-06 | End: 2020-11-07 | Stop reason: HOSPADM

## 2020-11-06 RX ORDER — DIPHENHYDRAMINE HYDROCHLORIDE 50 MG/ML
12.5 INJECTION INTRAMUSCULAR; INTRAVENOUS EVERY 4 HOURS PRN
Status: DISCONTINUED | OUTPATIENT
Start: 2020-11-06 | End: 2020-11-07

## 2020-11-06 RX ORDER — AMMONIA INHALANTS 0.04 G/.3ML
0.3 INHALANT RESPIRATORY (INHALATION) AS NEEDED
Status: DISCONTINUED | OUTPATIENT
Start: 2020-11-06 | End: 2020-11-07 | Stop reason: HOSPADM

## 2020-11-06 RX ORDER — BUPIVACAINE HCL/0.9 % NACL/PF 0.25 %
5 PLASTIC BAG, INJECTION (ML) EPIDURAL AS NEEDED
Status: DISCONTINUED | OUTPATIENT
Start: 2020-11-06 | End: 2020-11-07

## 2020-11-06 RX ORDER — ONDANSETRON 2 MG/ML
4 INJECTION INTRAMUSCULAR; INTRAVENOUS EVERY 6 HOURS PRN
Status: DISCONTINUED | OUTPATIENT
Start: 2020-11-06 | End: 2020-11-07 | Stop reason: HOSPADM

## 2020-11-06 RX ORDER — ACETAMINOPHEN 500 MG
500 TABLET ORAL EVERY 6 HOURS PRN
Status: DISCONTINUED | OUTPATIENT
Start: 2020-11-06 | End: 2020-11-06

## 2020-11-06 RX ORDER — DEXTROSE, SODIUM CHLORIDE, SODIUM LACTATE, POTASSIUM CHLORIDE, AND CALCIUM CHLORIDE 5; .6; .31; .03; .02 G/100ML; G/100ML; G/100ML; G/100ML; G/100ML
INJECTION, SOLUTION INTRAVENOUS AS NEEDED
Status: DISCONTINUED | OUTPATIENT
Start: 2020-11-06 | End: 2020-11-07 | Stop reason: HOSPADM

## 2020-11-06 RX ORDER — SODIUM CHLORIDE, SODIUM LACTATE, POTASSIUM CHLORIDE, CALCIUM CHLORIDE 600; 310; 30; 20 MG/100ML; MG/100ML; MG/100ML; MG/100ML
INJECTION, SOLUTION INTRAVENOUS CONTINUOUS
Status: DISCONTINUED | OUTPATIENT
Start: 2020-11-06 | End: 2020-11-07 | Stop reason: HOSPADM

## 2020-11-06 RX ORDER — ACETAMINOPHEN 500 MG
TABLET ORAL
Status: DISPENSED
Start: 2020-11-06 | End: 2020-11-07

## 2020-11-06 RX ORDER — ACETAMINOPHEN 500 MG
1000 TABLET ORAL EVERY 6 HOURS PRN
Status: DISCONTINUED | OUTPATIENT
Start: 2020-11-06 | End: 2020-11-07 | Stop reason: HOSPADM

## 2020-11-06 RX ORDER — IBUPROFEN 600 MG/1
600 TABLET ORAL EVERY 6 HOURS PRN
Status: DISCONTINUED | OUTPATIENT
Start: 2020-11-06 | End: 2020-11-07 | Stop reason: HOSPADM

## 2020-11-06 RX ORDER — TRISODIUM CITRATE DIHYDRATE AND CITRIC ACID MONOHYDRATE 500; 334 MG/5ML; MG/5ML
30 SOLUTION ORAL AS NEEDED
Status: DISCONTINUED | OUTPATIENT
Start: 2020-11-06 | End: 2020-11-07 | Stop reason: HOSPADM

## 2020-11-06 NOTE — H&P
[address] and Delivery Prenatal History and Physical Interval Addendum  Please see full Prenatal Record for this pregnancy      SUBJECTIVE:    Interval History: This is a 29yo [id number] at 39w1d admitted for elective IOL.     OBJECTIVE:    Th

## 2020-11-06 NOTE — PROGRESS NOTES
Labor Progress Note    No complaints.   Temp:  [98.3 °F (36.8 °C)] 98.3 °F (36.8 °C)  Pulse:  [58-75] 68  Resp:  [16] 16  BP: ()/(50-64) 100/50  FHT:  baseline 120s, moderate variability, accels appreciated, no decels  [place]:  contractions q2 minutes w

## 2020-11-06 NOTE — ANESTHESIA PREPROCEDURE EVALUATION
PRE-OP EVALUATION    Patient Name: Liam Nageotte    Pre-op Diagnosis: * No pre-op diagnosis entered *    * No procedures listed *    * No surgeons found in log *    Pre-op vitals reviewed.   Temp: 98.3 °F (36.8 °C)  Pulse: 73  Resp: 16  BP: 105/57  SpO2: >4    (+) obesity  (+) hypertension                                     Endo/Other               (+) anemia                   Pulmonary                           Neuro/Psych    Negative neuro/psych ROS.                                 Past Surgical History:

## 2020-11-06 NOTE — ANESTHESIA PROCEDURE NOTES
Labor Analgesia  Performed by: Jennifer Appiah MD  Authorized by: Jennifer Appiah MD       General Information and Staff    Start Time:  11/6/2020 1:00 PM  End Time:  11/6/2020 1:08 PM  Anesthesiologist:  Jennifer Appiah MD  Performed by:   Anesthesiologist

## 2020-11-06 NOTE — PROGRESS NOTES
Pt is a 28year old female admitted to 110/110-A. Pt is [id number] 39w1d intra-uterine pregnancy. Patient presents with:  Scheduled Induction: Elective; AMA     History obtained, consents signed. Oriented to room, staff, and plan of care.

## 2020-11-07 PROCEDURE — 85025 COMPLETE CBC W/AUTO DIFF WBC: CPT | Performed by: OBSTETRICS & GYNECOLOGY

## 2020-11-07 RX ORDER — HYDROCODONE BITARTRATE AND ACETAMINOPHEN 5; 325 MG/1; MG/1
1 TABLET ORAL EVERY 6 HOURS PRN
Status: DISCONTINUED | OUTPATIENT
Start: 2020-11-07 | End: 2020-11-08

## 2020-11-07 RX ORDER — SIMETHICONE 80 MG
80 TABLET,CHEWABLE ORAL 3 TIMES DAILY PRN
Status: DISCONTINUED | OUTPATIENT
Start: 2020-11-07 | End: 2020-11-08

## 2020-11-07 RX ORDER — METHYLERGONOVINE MALEATE 0.2 MG/ML
INJECTION INTRAVENOUS
Status: COMPLETED
Start: 2020-11-07 | End: 2020-11-07

## 2020-11-07 RX ORDER — METHYLERGONOVINE MALEATE 0.2 MG/ML
0.2 INJECTION INTRAVENOUS ONCE
Status: COMPLETED | OUTPATIENT
Start: 2020-11-07 | End: 2020-11-07

## 2020-11-07 RX ORDER — ACETAMINOPHEN 325 MG/1
650 TABLET ORAL EVERY 6 HOURS PRN
Status: DISCONTINUED | OUTPATIENT
Start: 2020-11-07 | End: 2020-11-08

## 2020-11-07 RX ORDER — MISOPROSTOL 200 UG/1
1000 TABLET ORAL ONCE
Status: COMPLETED | OUTPATIENT
Start: 2020-11-07 | End: 2020-11-07

## 2020-11-07 RX ORDER — MISOPROSTOL 200 UG/1
TABLET ORAL
Status: COMPLETED
Start: 2020-11-07 | End: 2020-11-07

## 2020-11-07 RX ORDER — IBUPROFEN 600 MG/1
600 TABLET ORAL EVERY 6 HOURS
Status: DISCONTINUED | OUTPATIENT
Start: 2020-11-07 | End: 2020-11-08

## 2020-11-07 RX ORDER — ENOXAPARIN SODIUM 100 MG/ML
40 INJECTION SUBCUTANEOUS DAILY
Status: DISCONTINUED | OUTPATIENT
Start: 2020-11-07 | End: 2020-11-08

## 2020-11-07 RX ORDER — DOCUSATE SODIUM 100 MG/1
100 CAPSULE, LIQUID FILLED ORAL
Status: DISCONTINUED | OUTPATIENT
Start: 2020-11-07 | End: 2020-11-08

## 2020-11-07 RX ORDER — BISACODYL 10 MG
10 SUPPOSITORY, RECTAL RECTAL ONCE AS NEEDED
Status: DISCONTINUED | OUTPATIENT
Start: 2020-11-07 | End: 2020-11-08

## 2020-11-07 RX ORDER — SODIUM CHLORIDE 9 MG/ML
INJECTION, SOLUTION INTRAVENOUS CONTINUOUS
Status: DISCONTINUED | OUTPATIENT
Start: 2020-11-07 | End: 2020-11-08

## 2020-11-07 NOTE — PLAN OF CARE
Problem: BIRTH - VAGINAL/ SECTION  Goal: Fetal and maternal status remain reassuring during the birth process  Description: INTERVENTIONS:  - Monitor vital signs  - Monitor fetal heart rate  - Monitor uterine activity  - Monitor labor progression Assess pt frequently for physical needs  - Identify cognitive and physical deficits and behaviors that affect risk of falls.   - [place] fall precautions as indicated by assessment.  - Educate pt/family on patient safety including physical limitations  -

## 2020-11-07 NOTE — PROGRESS NOTES
Dr. 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  Dr. Juan Jose Taylor made aware of this information being found in

## 2020-11-07 NOTE — PROGRESS NOTES
Labor Progress Note    No complaints.   Temp:  [98.3 °F (36.8 °C)] 98.3 °F (36.8 °C)  Pulse:  [58-75] 62  Resp:  [16] 16  BP: ()/(50-64) 99/50  FHT:  baseline 120s, moderate variability, accels appreciated, no decels  [place]:  contractions q2-4 minutes

## 2020-11-07 NOTE — PLAN OF CARE
Problem: BIRTH - VAGINAL/ SECTION  Goal: Fetal and maternal status remain reassuring during the birth process  Description: INTERVENTIONS:  - Monitor vital signs  - Monitor fetal heart rate  - Monitor uterine activity  - Monitor labor progression Arin Valdes, RN  Outcome: Completed  11/6/2020 2210 by Gino Wright RN  Outcome: Progressing  Goal: Patient/Family Short Term Goal  Description: Patient's Short Term Goal: effective pain management    Interventions:   - See additional Care Plan goals for sp

## 2020-11-07 NOTE — PROGRESS NOTES
C/O lower abdominal discomfort, fees like she may need to have a bowel movement. Just up and voided, fundus firm, U/U, lochia small. Up and about in room. IV in R hand flushed. S.O. in room w/ patient.

## 2020-11-07 NOTE — PROGRESS NOTES
OB Progress Note  CTSP for pp hemorrhage. Large EBL of 1100cc with last fundal check.   /56   Pulse 80   Temp 97.7 °F (36.5 °C) (Oral)   Resp 18   Ht 5' 4\" (1.626 m)   Wt 230 lb (104.3 kg)   LMP 02/06/2020 (Exact Date)   SpO2 98%   Breastfeeding Unk

## 2020-11-07 NOTE — L&D DELIVERY NOTE
Brain Cedillo [[id number]]    Labor Events     labor?: No   steroids?: None  Antibiotics received during labor?: No  Antibiotics (enter # doses in comment): none  Rupture date/time: 2020     Rupture type: AROM, Intact  Fluid color: Cl color: Heart rate:        Reflex irritablity:        Muscle tone:        Respiratory effort: Total:           Apgars assigned by: Shay Franco RN  [place] disposition: with mother     Skin to Skin    Skin to skin with:  Mother     Vaginal Count

## 2020-11-07 NOTE — PLAN OF CARE
Pt feeling pressure, wanted to be checked. Upon SVE, rn unsure of fetal position. Dr Jag Lentz notified.

## 2020-11-08 VITALS
WEIGHT: 230 LBS | HEART RATE: 73 BPM | SYSTOLIC BLOOD PRESSURE: 116 MMHG | TEMPERATURE: 98 F | RESPIRATION RATE: 18 BRPM | OXYGEN SATURATION: 98 % | BODY MASS INDEX: 39.27 KG/M2 | DIASTOLIC BLOOD PRESSURE: 46 MMHG | HEIGHT: 64 IN

## 2020-11-08 PROCEDURE — 85025 COMPLETE CBC W/AUTO DIFF WBC: CPT | Performed by: OBSTETRICS & GYNECOLOGY

## 2020-11-08 NOTE — PROGRESS NOTES
Patient complaints: a little dizzy but overall improved. Vital signs reviewed    Examination:  General: alert, appropriate affect  Abdomen: Fundus firm and no unexpected tenderness.   Remainder of abdomen unremarkable  Lochia: appropriate  Extremities:

## 2020-11-08 NOTE — PLAN OF CARE
Problem: SAFETY ADULT - FALL  Goal: Free from fall injury  Description: INTERVENTIONS:  - Assess pt frequently for physical needs  - Identify cognitive and physical deficits and behaviors that affect risk of falls.   - [place] fall precautions as indica previous experience with breast feeding.  - Provide information as needed about early infant feeding cues (e.g., rooting, lip smacking, sucking fingers/hand) versus late cue of crying.  - Discuss/demonstrate breast feeding aids (e.g., infant sling, nursing services/case management support as needed.   Outcome: Completed

## 2020-11-08 NOTE — PROGRESS NOTES
Labor Analgesia Follow Up Note    Patient underwent epidural anesthesia for labor analgesia,    Placenta Date/Time: 11/7/2020  2:58 AM    Delivery Date/Time[de-identified] 11/7/2020  2:57 AM    /46 (BP Location: Left arm)   Pulse 73   Temp 97.8 °F (36.6 °C) (Oral

## 2020-11-10 ENCOUNTER — TELEPHONE (OUTPATIENT)
Dept: OBGYN UNIT | Facility: HOSPITAL | Age: 35
End: 2020-11-10

## 2020-11-10 NOTE — PROGRESS NOTES
Елена Velez Call completed: Mom reports that she and infant are doing well. Has had pediatrician F/U visit. Reminded to schedule postpartum follow up visit. No complaints of PPD. Reviewed basic self and infant care.    Encouraged to follow up

## 2022-03-15 PROBLEM — Z34.90 PREGNANCY: Status: RESOLVED | Noted: 2020-11-06 | Resolved: 2022-03-15

## 2022-03-15 PROBLEM — O09.899 PRIOR PREGNANCY COMPLICATED BY PIH, ANTEPARTUM: Status: RESOLVED | Noted: 2020-05-18 | Resolved: 2022-03-15

## 2022-03-15 PROBLEM — O09.529 AMA (ADVANCED MATERNAL AGE) MULTIGRAVIDA 35+: Status: RESOLVED | Noted: 2020-05-18 | Resolved: 2022-03-15

## 2022-04-01 PROBLEM — Z30.2 REQUEST FOR STERILIZATION: Status: RESOLVED | Noted: 2020-08-31 | Resolved: 2022-04-01

## 2023-02-15 RX ORDER — PHENTERMINE HYDROCHLORIDE 37.5 MG/1
37.5 CAPSULE ORAL EVERY MORNING
COMMUNITY

## 2023-02-16 ENCOUNTER — ANESTHESIA (OUTPATIENT)
Dept: SURGERY | Facility: HOSPITAL | Age: 38
End: 2023-02-16
Payer: MEDICAID

## 2023-02-16 ENCOUNTER — ANESTHESIA EVENT (OUTPATIENT)
Dept: SURGERY | Facility: HOSPITAL | Age: 38
End: 2023-02-16
Payer: MEDICAID

## 2023-02-16 ENCOUNTER — HOSPITAL ENCOUNTER (OUTPATIENT)
Facility: HOSPITAL | Age: 38
Setting detail: HOSPITAL OUTPATIENT SURGERY
Discharge: HOME | End: 2023-02-16
Attending: ORTHOPAEDIC SURGERY | Admitting: ORTHOPAEDIC SURGERY
Payer: MEDICAID

## 2023-02-16 VITALS
HEART RATE: 70 BPM | BODY MASS INDEX: 35.27 KG/M2 | RESPIRATION RATE: 16 BRPM | OXYGEN SATURATION: 100 % | WEIGHT: 199.06 LBS | HEIGHT: 63 IN | TEMPERATURE: 98 F | DIASTOLIC BLOOD PRESSURE: 57 MMHG | SYSTOLIC BLOOD PRESSURE: 117 MMHG

## 2023-02-16 LAB — B-HCG UR QL: NEGATIVE

## 2023-02-16 PROCEDURE — 01N50ZZ RELEASE MEDIAN NERVE, OPEN APPROACH: ICD-10-PCS | Performed by: ORTHOPAEDIC SURGERY

## 2023-02-16 PROCEDURE — 81025 URINE PREGNANCY TEST: CPT

## 2023-02-16 RX ORDER — SODIUM CHLORIDE, SODIUM LACTATE, POTASSIUM CHLORIDE, CALCIUM CHLORIDE 600; 310; 30; 20 MG/100ML; MG/100ML; MG/100ML; MG/100ML
INJECTION, SOLUTION INTRAVENOUS CONTINUOUS
Status: DISCONTINUED | OUTPATIENT
Start: 2023-02-16 | End: 2023-02-16

## 2023-02-16 RX ORDER — KETOROLAC TROMETHAMINE 30 MG/ML
INJECTION, SOLUTION INTRAMUSCULAR; INTRAVENOUS AS NEEDED
Status: DISCONTINUED | OUTPATIENT
Start: 2023-02-16 | End: 2023-02-16 | Stop reason: SURG

## 2023-02-16 RX ORDER — CEFAZOLIN SODIUM/WATER 2 G/20 ML
SYRINGE (ML) INTRAVENOUS
Status: DISCONTINUED
Start: 2023-02-16 | End: 2023-02-16

## 2023-02-16 RX ORDER — ACETAMINOPHEN 500 MG
1000 TABLET ORAL ONCE
Status: DISCONTINUED | OUTPATIENT
Start: 2023-02-16 | End: 2023-02-16 | Stop reason: HOSPADM

## 2023-02-16 RX ORDER — HYDROCODONE BITARTRATE AND ACETAMINOPHEN 5; 325 MG/1; MG/1
2 TABLET ORAL ONCE AS NEEDED
Status: DISCONTINUED | OUTPATIENT
Start: 2023-02-16 | End: 2023-02-16

## 2023-02-16 RX ORDER — MEPERIDINE HYDROCHLORIDE 25 MG/ML
25 INJECTION INTRAMUSCULAR; INTRAVENOUS; SUBCUTANEOUS
Status: DISCONTINUED | OUTPATIENT
Start: 2023-02-16 | End: 2023-02-16

## 2023-02-16 RX ORDER — HYDROCODONE BITARTRATE AND ACETAMINOPHEN 5; 325 MG/1; MG/1
1 TABLET ORAL ONCE AS NEEDED
Status: DISCONTINUED | OUTPATIENT
Start: 2023-02-16 | End: 2023-02-16

## 2023-02-16 RX ORDER — TRAMADOL HYDROCHLORIDE 50 MG/1
50 TABLET ORAL EVERY 6 HOURS PRN
Qty: 10 TABLET | Refills: 1 | Status: SHIPPED | OUTPATIENT
Start: 2023-02-16 | End: 2023-02-26

## 2023-02-16 RX ORDER — CEFAZOLIN SODIUM/WATER 2 G/20 ML
2 SYRINGE (ML) INTRAVENOUS ONCE
Status: DISCONTINUED | OUTPATIENT
Start: 2023-02-16 | End: 2023-02-16 | Stop reason: HOSPADM

## 2023-02-16 RX ORDER — ONDANSETRON 2 MG/ML
4 INJECTION INTRAMUSCULAR; INTRAVENOUS EVERY 6 HOURS PRN
Status: DISCONTINUED | OUTPATIENT
Start: 2023-02-16 | End: 2023-02-16

## 2023-02-16 RX ORDER — ACETAMINOPHEN 500 MG
1000 TABLET ORAL ONCE AS NEEDED
Status: DISCONTINUED | OUTPATIENT
Start: 2023-02-16 | End: 2023-02-16

## 2023-02-16 RX ORDER — MIDAZOLAM HYDROCHLORIDE 1 MG/ML
1 INJECTION INTRAMUSCULAR; INTRAVENOUS EVERY 5 MIN PRN
Status: DISCONTINUED | OUTPATIENT
Start: 2023-02-16 | End: 2023-02-16

## 2023-02-16 RX ORDER — LIDOCAINE HYDROCHLORIDE 10 MG/ML
INJECTION, SOLUTION EPIDURAL; INFILTRATION; INTRACAUDAL; PERINEURAL AS NEEDED
Status: DISCONTINUED | OUTPATIENT
Start: 2023-02-16 | End: 2023-02-16 | Stop reason: SURG

## 2023-02-16 RX ORDER — SCOLOPAMINE TRANSDERMAL SYSTEM 1 MG/1
1 PATCH, EXTENDED RELEASE TRANSDERMAL ONCE
Status: DISCONTINUED | OUTPATIENT
Start: 2023-02-16 | End: 2023-02-16 | Stop reason: HOSPADM

## 2023-02-16 RX ORDER — LIDOCAINE HYDROCHLORIDE AND EPINEPHRINE 10; 10 MG/ML; UG/ML
INJECTION, SOLUTION INFILTRATION; PERINEURAL AS NEEDED
Status: DISCONTINUED | OUTPATIENT
Start: 2023-02-16 | End: 2023-02-16 | Stop reason: HOSPADM

## 2023-02-16 RX ORDER — ACETAMINOPHEN AND CODEINE PHOSPHATE 300; 30 MG/1; MG/1
1-2 TABLET ORAL EVERY 6 HOURS PRN
Qty: 10 TABLET | Refills: 1 | Status: SHIPPED | OUTPATIENT
Start: 2023-02-16

## 2023-02-16 RX ORDER — NALOXONE HYDROCHLORIDE 0.4 MG/ML
80 INJECTION, SOLUTION INTRAMUSCULAR; INTRAVENOUS; SUBCUTANEOUS AS NEEDED
Status: DISCONTINUED | OUTPATIENT
Start: 2023-02-16 | End: 2023-02-16

## 2023-02-16 RX ADMIN — KETOROLAC TROMETHAMINE 30 MG: 30 INJECTION, SOLUTION INTRAMUSCULAR; INTRAVENOUS at 07:30:00

## 2023-02-16 RX ADMIN — LIDOCAINE HYDROCHLORIDE 10 MG: 10 INJECTION, SOLUTION EPIDURAL; INFILTRATION; INTRACAUDAL; PERINEURAL at 07:06:00

## 2023-02-16 NOTE — ANESTHESIA POSTPROCEDURE EVALUATION
MIGUEL/ Ganesh 66 Patient Status:  Hospital Outpatient Surgery   Age/Gender 45year old female MRN [id number]   Location [address] [place] [address] Attending Deirdre Dover MD   Hosp Day # 0 PCP Denis Headley MD       Anesthesia Post-op Note    RIGHT CARPAL TUNNEL RELEASE    Procedure Summary     Date: 02/16/23 Room / Location: [address] [place] [address] OR 17 / 1404 [facility] OR    Anesthesia Start: 5847 Anesthesia Stop:     Procedure: RIGHT CARPAL TUNNEL RELEASE (Right: Hand) Diagnosis: (BILATERAL CARPAL TUNNEL SYMDROME)    Surgeons: Deirdre Dover MD Anesthesiologist: Maco Rincon MD    Anesthesia Type: MAC ASA Status: 1          Anesthesia Type: MAC    Vitals Value Taken Time   /54 02/16/23 0741   Temp 98 02/16/23 0743   Pulse 74 02/16/23 0741   Resp 12 02/16/23 0743   SpO2 99 % 02/16/23 0741   Vitals shown include unvalidated device data. Patient Location: Same Day Surgery    Anesthesia Type: MAC    Airway Patency: patent    Postop Pain Control: adequate    Mental Status: mildly sedated but able to meaningfully participate in the post-anesthesia evaluation    Nausea/Vomiting: none    Cardiopulmonary/Hydration status: stable euvolemic    Complications: no apparent anesthesia related complications    Postop vital signs: stable    Dental Exam: Unchanged from Preop    Patient to be discharged from PACU when criteria met.

## 2023-02-16 NOTE — DISCHARGE INSTRUCTIONS
Move fingers often  Elevate and ice the extremity  Remove dressings in 2 days  Return to clinic next week        You received a drug called Toradol which is an anti-inflammatory at : 7:30  If you are allowed to take anti-inflammatories:   Do not take any anti-inflammatory like Motrin Aleve or Ibuprofen until after :1:30  Please report any suspected reaction or bleeding issues to your Doctor.

## 2023-02-16 NOTE — OR NURSING
Called Dr Gala Edwards patient had concerns regarding her pain medication. She has sinus issue and take sinus medication with tylenol in it would like to switch to another pain medication. Dr Gala Edwards will call in Tramadol in about 2 hours from now. Patient is in agreement would rather wait.

## 2023-02-16 NOTE — BRIEF OP NOTE
Pre-Operative Diagnosis: BILATERAL CARPAL TUNNEL SYMDROME     Post-Operative Diagnosis: BILATERAL CARPAL TUNNEL SYMDROME      Procedure Performed:   RIGHT CARPAL TUNNEL RELEASE    Surgeon(s) and Role:     Christian Lopez MD - Primary    Assistant(s):        Surgical Findings: c/w dx     Specimen: none     Estimated Blood Loss: Blood Output: 1 mL (2/16/2023  7:31 AM)      Dictation Number:       Marcus Ramachandran MD  2/16/2023  7:54 AM

## 2023-02-17 NOTE — OPERATIVE REPORT
[address]    PATIENT'S NAME: Dany Hawthorne   ATTENDING PHYSICIAN: Sammy Lea M.D. OPERATING PHYSICIAN: Sammy Lea M.D. PATIENT ACCOUNT#:   [de-identified]    LOCATION:  [address] [place] [address]  MEDICAL RECORD #:   [id number]       YOB: 1985  ADMISSION DATE:       02/16/2023      OPERATION DATE:  02/16/2023    OPERATIVE REPORT    PREOPERATIVE DIAGNOSIS:  Right-sided carpal tunnel syndrome. POSTOPERATIVE DIAGNOSIS:  Right-sided carpal tunnel syndrome. PROCEDURE:  Right-sided carpal tunnel release. ANESTHESIA:  MAC.    ESTIMATED BLOOD LOSS:  Minimal.    TOURNIQUET TIME:  Please see OR records. SPECIMENS:  None. COMPLICATIONS:  None. DISPOSITION:  Fair condition to the recovery room. PLAN:  Patient can begin immediate range of motion. INDICATIONS:  The patient is a 43-year-old female with a history of right-sided carpal tunnel syndrome. She continued to have difficulty despite bracing and anti-inflammatories. She was, therefore, offered surgical intervention. The risks and benefits of the procedure were discussed in detail with the patient including risk of incomplete and delayed recovery of function, chronic pain, pillar pain, skin healing problems, and infection. She showed good understanding of these issues and wished to proceed with surgery. FINDINGS:  Consistent with diagnosis. OPERATIVE TECHNIQUE:  On the date of operation, I saw the patient in the holding area and initialed the surgical site. The patient was taken to the operating room and was placed in a supine position on the OR table. A MAC anesthetic was performed by Anesthesia. After this, the area surrounding the proposed incision was infiltrated with lidocaine with epinephrine. Tourniquet placed about the right biceps. The right upper extremity was prepped and draped in standard surgical fashion.   A surgical time-out was taken, in which the proper patient, surgical site, and procedure were verified. The limb was exsanguinated with an Esmarch, and tourniquet was inflated to 250 mmHg. A longitudinal incision at the proximal aspect of the palm paralleling the thenar crease was performed. Dissection was carried down through the soft tissue and full-thickness skin flaps were raised. The palmar aponeurosis was divided longitudinally, and the underlying transcarpal ligament was identified. Distal fibers were divided with a 15 blade. The guide for the entire carpal tunnel was passed deep to the transcarpal ligament. The ligament was divided in a distal to proximal fashion. The contents of the carpal tunnel were inspected and were found to be intact. The wound was copiously irrigated and the skin was closed with horizontal mattress 4-0 nylon suture. Sterile bulky dressings were applied. The patient was taken to the recovery room in fair condition. She will be sent home with postoperative written and oral instructions as well as oral narcotics for postop pain. She will follow up 1 week for wound check.      Dictated By Jen Hernandez M.D.  d: 02/16/2023 07:56:20  t: 02/16/2023 08:48:15  [facility] [id number]  [place]/

## 2023-05-27 NOTE — PROGRESS NOTES
Name: Spenser Rojas MRN: [id number]  SSN:     YOB: 1999  Age: 21 y.o. Sex: female      Allergies: Patient has no known allergies. Admit Date: 2023    Discharge Date: 2023     Admitting Physician: Tyler Howard MD     Attending Physician:  Tyler Howard MD     * Admission Diagnoses: IUP (intrauterine pregnancy), incidental [Z33.1]    * Discharge Diagnoses:   Information for the patient's :  Sherice Beebe [[id number]]   @[phone]@      Additional Diagnoses:    Lab Results   Component Value Date/Time    82 Lona Ott O POSITIVE 2023 07:55 PM    There is no immunization history for the selected administration types on file for this patient. * Procedures: vaginal delivery  * No surgery found *           * Discharge Condition: [place] Hospital Course: Normal hospital course following the delivery. * Disposition:  to home, follow up 6wks postpartum in office    Discharge Medications:      Medication List        ASK your doctor about these medications      acetaminophen 325 MG tablet  Commonly known as: TYLENOL              * Follow-up Care/Patient Instructions: Activity: activity as tolerated; no heavy lifting or Strenuous exercise for 6weeks. No sex advised for 6 weeks.   Diet: regular diet Outgoing cradle call completed. Mom reports that she and infant are doing well. Has had pediatrician F/U visit. Has PP F/U visit scheduled. No complaints of PPB or PPD. Reviewed basic infant and self care; verbalizes understanding.   Encouraged to foll

## 2023-08-02 RX ORDER — METFORMIN HYDROCHLORIDE EXTENDED-RELEASE TABLETS 500 MG/1
500 TABLET, FILM COATED, EXTENDED RELEASE ORAL 2 TIMES DAILY WITH MEALS
COMMUNITY

## 2023-08-15 ENCOUNTER — LAB ENCOUNTER (OUTPATIENT)
Dept: LAB | Age: 38
End: 2023-08-15
Attending: ORTHOPAEDIC SURGERY
Payer: MEDICAID

## 2023-08-15 DIAGNOSIS — G56.02 CARPAL TUNNEL SYNDROME ON LEFT: ICD-10-CM

## 2023-08-15 LAB
CHLORIDE SERPL-SCNC: 107 MMOL/L (ref 98–112)
CO2 SERPL-SCNC: 25 MMOL/L (ref 21–32)
POTASSIUM SERPL-SCNC: 4.3 MMOL/L (ref 3.5–5.1)
SODIUM SERPL-SCNC: 136 MMOL/L (ref 136–145)

## 2023-08-15 PROCEDURE — 80051 ELECTROLYTE PANEL: CPT

## 2023-08-15 PROCEDURE — 36415 COLL VENOUS BLD VENIPUNCTURE: CPT

## 2023-08-17 ENCOUNTER — ANESTHESIA EVENT (OUTPATIENT)
Dept: SURGERY | Facility: HOSPITAL | Age: 38
End: 2023-08-17
Payer: MEDICAID

## 2023-08-17 ENCOUNTER — ANESTHESIA (OUTPATIENT)
Dept: SURGERY | Facility: HOSPITAL | Age: 38
End: 2023-08-17
Payer: MEDICAID

## 2023-08-17 ENCOUNTER — HOSPITAL ENCOUNTER (OUTPATIENT)
Facility: HOSPITAL | Age: 38
Setting detail: HOSPITAL OUTPATIENT SURGERY
Discharge: HOME | End: 2023-08-17
Attending: ORTHOPAEDIC SURGERY | Admitting: ORTHOPAEDIC SURGERY
Payer: MEDICAID

## 2023-08-17 VITALS
RESPIRATION RATE: 18 BRPM | OXYGEN SATURATION: 99 % | SYSTOLIC BLOOD PRESSURE: 102 MMHG | WEIGHT: 196.63 LBS | HEIGHT: 63 IN | DIASTOLIC BLOOD PRESSURE: 65 MMHG | BODY MASS INDEX: 34.84 KG/M2 | TEMPERATURE: 97 F | HEART RATE: 59 BPM

## 2023-08-17 DIAGNOSIS — G56.02 CARPAL TUNNEL SYNDROME ON LEFT: Primary | ICD-10-CM

## 2023-08-17 LAB — B-HCG UR QL: NEGATIVE

## 2023-08-17 PROCEDURE — 01N50ZZ RELEASE MEDIAN NERVE, OPEN APPROACH: ICD-10-PCS | Performed by: ORTHOPAEDIC SURGERY

## 2023-08-17 PROCEDURE — 81025 URINE PREGNANCY TEST: CPT

## 2023-08-17 RX ORDER — TRAMADOL HYDROCHLORIDE 50 MG/1
50 TABLET ORAL EVERY 6 HOURS PRN
Qty: 10 TABLET | Refills: 1 | Status: SHIPPED | OUTPATIENT
Start: 2023-08-17 | End: 2023-08-27

## 2023-08-17 RX ORDER — HYDROCODONE BITARTRATE AND ACETAMINOPHEN 5; 325 MG/1; MG/1
2 TABLET ORAL ONCE AS NEEDED
Status: DISCONTINUED | OUTPATIENT
Start: 2023-08-17 | End: 2023-08-17

## 2023-08-17 RX ORDER — MIDAZOLAM HYDROCHLORIDE 1 MG/ML
INJECTION INTRAMUSCULAR; INTRAVENOUS AS NEEDED
Status: DISCONTINUED | OUTPATIENT
Start: 2023-08-17 | End: 2023-08-17 | Stop reason: SURG

## 2023-08-17 RX ORDER — ONDANSETRON 2 MG/ML
INJECTION INTRAMUSCULAR; INTRAVENOUS AS NEEDED
Status: DISCONTINUED | OUTPATIENT
Start: 2023-08-17 | End: 2023-08-17 | Stop reason: SURG

## 2023-08-17 RX ORDER — HYDROCODONE BITARTRATE AND ACETAMINOPHEN 5; 325 MG/1; MG/1
1 TABLET ORAL ONCE AS NEEDED
Status: DISCONTINUED | OUTPATIENT
Start: 2023-08-17 | End: 2023-08-17

## 2023-08-17 RX ORDER — HYDROMORPHONE HYDROCHLORIDE 1 MG/ML
0.2 INJECTION, SOLUTION INTRAMUSCULAR; INTRAVENOUS; SUBCUTANEOUS EVERY 5 MIN PRN
Status: DISCONTINUED | OUTPATIENT
Start: 2023-08-17 | End: 2023-08-17

## 2023-08-17 RX ORDER — ONDANSETRON 2 MG/ML
4 INJECTION INTRAMUSCULAR; INTRAVENOUS EVERY 6 HOURS PRN
Status: DISCONTINUED | OUTPATIENT
Start: 2023-08-17 | End: 2023-08-17

## 2023-08-17 RX ORDER — ACETAMINOPHEN 500 MG
1000 TABLET ORAL ONCE
Status: DISCONTINUED | OUTPATIENT
Start: 2023-08-17 | End: 2023-08-17 | Stop reason: HOSPADM

## 2023-08-17 RX ORDER — NALOXONE HYDROCHLORIDE 0.4 MG/ML
80 INJECTION, SOLUTION INTRAMUSCULAR; INTRAVENOUS; SUBCUTANEOUS AS NEEDED
Status: DISCONTINUED | OUTPATIENT
Start: 2023-08-17 | End: 2023-08-17

## 2023-08-17 RX ORDER — HYDROMORPHONE HYDROCHLORIDE 1 MG/ML
0.6 INJECTION, SOLUTION INTRAMUSCULAR; INTRAVENOUS; SUBCUTANEOUS EVERY 5 MIN PRN
Status: DISCONTINUED | OUTPATIENT
Start: 2023-08-17 | End: 2023-08-17

## 2023-08-17 RX ORDER — LIDOCAINE HYDROCHLORIDE 10 MG/ML
INJECTION, SOLUTION EPIDURAL; INFILTRATION; INTRACAUDAL; PERINEURAL AS NEEDED
Status: DISCONTINUED | OUTPATIENT
Start: 2023-08-17 | End: 2023-08-17 | Stop reason: SURG

## 2023-08-17 RX ORDER — PROCHLORPERAZINE EDISYLATE 5 MG/ML
5 INJECTION INTRAMUSCULAR; INTRAVENOUS EVERY 8 HOURS PRN
Status: DISCONTINUED | OUTPATIENT
Start: 2023-08-17 | End: 2023-08-17

## 2023-08-17 RX ORDER — CEFAZOLIN SODIUM/WATER 2 G/20 ML
2 SYRINGE (ML) INTRAVENOUS ONCE
Status: COMPLETED | OUTPATIENT
Start: 2023-08-17 | End: 2023-08-17

## 2023-08-17 RX ORDER — LIDOCAINE HYDROCHLORIDE AND EPINEPHRINE 10; 10 MG/ML; UG/ML
INJECTION, SOLUTION INFILTRATION; PERINEURAL AS NEEDED
Status: DISCONTINUED | OUTPATIENT
Start: 2023-08-17 | End: 2023-08-17 | Stop reason: HOSPADM

## 2023-08-17 RX ORDER — HYDROMORPHONE HYDROCHLORIDE 1 MG/ML
0.4 INJECTION, SOLUTION INTRAMUSCULAR; INTRAVENOUS; SUBCUTANEOUS EVERY 5 MIN PRN
Status: DISCONTINUED | OUTPATIENT
Start: 2023-08-17 | End: 2023-08-17

## 2023-08-17 RX ORDER — SODIUM CHLORIDE, SODIUM LACTATE, POTASSIUM CHLORIDE, CALCIUM CHLORIDE 600; 310; 30; 20 MG/100ML; MG/100ML; MG/100ML; MG/100ML
INJECTION, SOLUTION INTRAVENOUS CONTINUOUS
Status: DISCONTINUED | OUTPATIENT
Start: 2023-08-17 | End: 2023-08-17

## 2023-08-17 RX ORDER — CEFAZOLIN SODIUM/WATER 2 G/20 ML
SYRINGE (ML) INTRAVENOUS
Status: DISCONTINUED
Start: 2023-08-17 | End: 2023-08-17

## 2023-08-17 RX ORDER — SCOLOPAMINE TRANSDERMAL SYSTEM 1 MG/1
1 PATCH, EXTENDED RELEASE TRANSDERMAL ONCE
Status: DISCONTINUED | OUTPATIENT
Start: 2023-08-17 | End: 2023-08-17 | Stop reason: HOSPADM

## 2023-08-17 RX ORDER — ACETAMINOPHEN 500 MG
1000 TABLET ORAL ONCE AS NEEDED
Status: DISCONTINUED | OUTPATIENT
Start: 2023-08-17 | End: 2023-08-17

## 2023-08-17 RX ADMIN — SODIUM CHLORIDE, SODIUM LACTATE, POTASSIUM CHLORIDE, CALCIUM CHLORIDE: 600; 310; 30; 20 INJECTION, SOLUTION INTRAVENOUS at 13:01:00

## 2023-08-17 RX ADMIN — ONDANSETRON 4 MG: 2 INJECTION INTRAMUSCULAR; INTRAVENOUS at 13:07:00

## 2023-08-17 RX ADMIN — MIDAZOLAM HYDROCHLORIDE 2 MG: 1 INJECTION INTRAMUSCULAR; INTRAVENOUS at 13:01:00

## 2023-08-17 RX ADMIN — LIDOCAINE HYDROCHLORIDE 50 MG: 10 INJECTION, SOLUTION EPIDURAL; INFILTRATION; INTRACAUDAL; PERINEURAL at 13:05:00

## 2023-08-17 RX ADMIN — CEFAZOLIN SODIUM/WATER 2 G: 2 G/20 ML SYRINGE (ML) INTRAVENOUS at 13:06:00

## 2023-08-17 NOTE — BRIEF OP NOTE
Pre-Operative Diagnosis: CARPAL TUNNEL SYNDROME ON LEFT     Post-Operative Diagnosis: CARPAL TUNNEL SYNDROME ON LEFT      Procedure Performed:   LEFT CARPAL TUNNEL RELEASE    Surgeon(s) and Role:     Christopher Flores MD - Primary    Assistant(s):        Surgical Findings: c/w dx     Specimen: none     Estimated Blood Loss: Blood Output: 2 mL (8/17/2023  1:25 PM)      Dictation Number:       Delfina Bowen MD  8/17/2023  1:34 PM

## 2023-08-17 NOTE — DISCHARGE INSTRUCTIONS
Move fingers often  Elevate and ice the extremity  Remove dressings in 2 days  Return to clinic next week

## 2023-08-17 NOTE — OPERATIVE REPORT
[address]    PATIENT'S NAME: Ivan Quintanilla   ATTENDING PHYSICIAN: Raheel Hare M.D. OPERATING PHYSICIAN: Raheel Hare M.D. PATIENT ACCOUNT#:   [de-identified]    LOCATION:  [place] [address]  MEDICAL RECORD #:   [id number]       YOB: 1985  ADMISSION DATE:       08/17/2023      OPERATION DATE:  08/17/2023    OPERATIVE REPORT      PREOPERATIVE DIAGNOSIS:  Left carpal tunnel syndrome. POSTOPERATIVE DIAGNOSIS:  Left carpal tunnel syndrome. PROCEDURE:  Left carpal tunnel release. ANESTHESIA:  MAC.    ESTIMATED BLOOD LOSS:  Minimal.    TOURNIQUET TIME:  Please see OR records. SPECIMENS:  None. COMPLICATIONS:  None. DISPOSITION:  Fair condition to the recovery room. PLAN:  Patient can begin immediate range of motion exercises. INDICATIONS:  The patient is a 42-year-old female with history of bilateral carpal tunnel syndrome. She had the right side addressed previously and now presents to have the left side addressed. She continued to have difficulty despite bracing and anti-inflammatories. The risks and benefits of surgery were discussed in detail with the patient including risk of incomplete and delayed recovery of function, chronic pain, pillar pain, skin healing problems, infection. She shows good understanding of these issues and wished to proceed with surgery. FINDINGS:  Consistent with diagnosis. OPERATIVE TECHNIQUE:  On the date of operation, I saw the patient in the holding room, initialed the surgical site. The patient was taken to the operating room and was placed in supine position on the OR table. A MAC anesthetic was performed by Anesthesia. The left upper extremity was prepped and draped in standard surgical fashion after a tourniquet had been placed about the left biceps. A surgical time-out was taken during which the proper patient, surgical site, and procedure were verified.   The area surrounding the proposed incision was infiltrated with lidocaine with epinephrine. The limb was exsanguinated with an Esmarch, and tourniquet was inflated to 250 mmHg. Then, a longitudinal incision in the proximal aspect of the palm paralleling the thenar crease was performed. Dissection was carried down through soft tissue, and skin flaps were raised. The palmar aponeurosis was divided longitudinally, and the underlying transverse carpal ligament was identified. The distal fibers of the transverse carpal ligament were divided, and the guide for the Integra carpal tunnel knife was passed deep to the transverse carpal ligament. The ligament was divided in a distal to proximal fashion. Following this, the contents of the carpal tunnel were inspected and were found to be intact. The wound was copiously irrigated, and the skin flaps were closed with horizontal mattress 4-0 nylon suture. Sterile bulky dressings were applied. The tourniquet was released and the fingers pinked up nicely. The patient was awakened from anesthesia and was taken to the recovery room in fair condition. She was sent home with postoperative written and oral instructions, as well as oral narcotics for postoperative pain. She will follow up in 1 week for a wound check.     Dictated By Paul Aranda M.D.  d: 08/17/2023 13:33:55  t: 08/17/2023 15:39:00  [facility] [id number]  /

## 2023-08-17 NOTE — ANESTHESIA POSTPROCEDURE EVALUATION
MIGUEL/ Ganesh 66 Patient Status:  Hospital Outpatient Surgery   Age/Gender 45year old female MRN [id number]   [facility] SURGERY Attending Wallace Foster MD   Hosp Day # 0 PCP John Kebede MD       Anesthesia Post-op Note    LEFT CARPAL TUNNEL RELEASE    Procedure Summary       Date: 08/17/23 Room / Location: [address] [place] [address] OR 05 / 1404 [facility] OR    Anesthesia Start: 1300 Anesthesia Stop: 2451    Procedure: LEFT CARPAL TUNNEL RELEASE (Left: Wrist) Diagnosis: (CARPAL TUNNEL SYNDROME ON LEFT)    Surgeons: Wallace Foster MD Anesthesiologist: Diandra Rowland MD    Anesthesia Type: MAC ASA Status: Not recorded            Anesthesia Type: MAC    Vitals Value Taken Time   /67 08/17/23 1338   Temp 97.5 08/17/23 1338   Pulse 94 08/17/23 1338   Resp 20 08/17/23 1338   SpO2 98 08/17/23 1338       Patient Location: Same Day Surgery    Anesthesia Type: MAC    Airway Patency: patent    Postop Pain Control: adequate    Mental Status: mildly sedated but able to meaningfully participate in the post-anesthesia evaluation    Nausea/Vomiting: none    Cardiopulmonary/Hydration status: stable euvolemic    Complications: no apparent anesthesia related complications    Postop vital signs: stable    Dental Exam: Unchanged from Preop    Patient to be discharged home when criteria met.

## (undated) DEVICE — STERILE POLYISOPRENE POWDER-FREE SURGICAL GLOVES: Brand: PROTEXIS

## (undated) DEVICE — SUT ETHILON 4-0 PS-2 1667H

## (undated) DEVICE — KNIFE CARPAL TUNNEL 08-0005

## (undated) DEVICE — STANDARD HYPODERMIC NEEDLE,POLYPROPYLENE HUB: Brand: MONOJECT

## (undated) DEVICE — UPPER EXTREMITY CDS-LF: Brand: MEDLINE INDUSTRIES, INC.

## (undated) DEVICE — STERILE SYNTHETIC POLYISOPRENE POWDER-FREE SURGICAL GLOVES WITH HYDROGEL COATING: Brand: PROTEXIS

## (undated) DEVICE — SLEEVE KENDALL SCD EXPRESS MED

## (undated) DEVICE — GOWN AERO CHROME XXL

## (undated) DEVICE — SYRINGE BULB 50/CS 48/PLT: Brand: MEDEGEN MEDICAL PRODUCTS, LLC

## (undated) DEVICE — PADDING CAST COTTON  4

## (undated) DEVICE — DISPOSABLE TOURNIQUET CUFF SINGLE BLADDER, DUAL PORT AND QUICK CONNECT CONNECTOR: Brand: COLOR CUFF

## (undated) DEVICE — SOL NACL IRRIG 0.9% 1000ML BTL

## (undated) NOTE — ED AVS SNAPSHOT
Ymaile Barr   MRN: [id number]    Department:  [facility] Emergency Department   Date of Visit:  6/4/2019           Disclosure     Insurance plans vary and the physician(s) referred by the ER may not be covered by your plan.  Please contact you tell this physician (or your personal doctor if your instructions are to return to your personal doctor) about any new or lasting problems. The primary care or specialist physician will see patients referred from the [facility] Emergency Department.  Evans Rivera

## (undated) NOTE — LETTER
CLARIFICATION FOR E-SSS    To: Dr. Dawson Rodriguez     Patient Name: Ken Rueda / Sex: [birth year]-E: 45 y  female   Medical Records: [id number] [facility]: [id number]      Procedure Description:  LEFT CARPAL TUNNEL RELEASE    Please note that clarification is needed on the Electronic-Surgery Scheduling Sheet (E-SSS)  the original is included with this letter. Please have physician review and make changes on the faxed copy of the E-SSS. Please review and submit change if needed    Other: Anesthesia Type indicated on SSS as Local - Patient states she should receive the same anesthesia as previous carpal tunnel release in 02/2023    ALL CHANGES MUST BE DOCUMENTED ON THE E-SSS AND SIGNED BY THE PHYSICIAN    After physician has made the changes and signed the E-SSS please fax it to [phone] and these changes will then be made in Epic by the OR schedulers.      If you have any questions please call Pre-Admission Testing at [phone]    Thank you